# Patient Record
Sex: MALE | Race: WHITE | NOT HISPANIC OR LATINO | Employment: OTHER | URBAN - METROPOLITAN AREA
[De-identification: names, ages, dates, MRNs, and addresses within clinical notes are randomized per-mention and may not be internally consistent; named-entity substitution may affect disease eponyms.]

---

## 2022-11-14 PROBLEM — U07.1 COVID-19 VIRUS INFECTION: Status: ACTIVE | Noted: 2022-11-14

## 2022-12-05 PROBLEM — Z00.00 PREVENTATIVE HEALTH CARE: Status: ACTIVE | Noted: 2022-12-05

## 2022-12-05 PROBLEM — U07.1 COVID-19 VIRUS INFECTION: Status: RESOLVED | Noted: 2022-11-14 | Resolved: 2022-12-05

## 2022-12-05 PROBLEM — Z00.00 PREVENTATIVE HEALTH CARE: Status: RESOLVED | Noted: 2022-12-05 | Resolved: 2022-12-05

## 2022-12-05 PROBLEM — E78.00 HYPERCHOLESTEROLEMIA: Status: ACTIVE | Noted: 2022-12-05

## 2022-12-05 PROBLEM — E55.9 VITAMIN D DEFICIENCY, UNSPECIFIED: Status: ACTIVE | Noted: 2022-12-05

## 2022-12-05 PROBLEM — Z86.16 HISTORY OF 2019 NOVEL CORONAVIRUS DISEASE (COVID-19): Status: ACTIVE | Noted: 2022-12-05

## 2023-01-11 ENCOUNTER — OFFICE VISIT (OUTPATIENT)
Dept: OPHTHALMOLOGY | Facility: CLINIC | Age: 78
End: 2023-01-11
Payer: MEDICARE

## 2023-01-11 DIAGNOSIS — H35.033 HYPERTENSIVE RETINOPATHY, BILATERAL: ICD-10-CM

## 2023-01-11 DIAGNOSIS — H35.3221 EXUDATIVE AGE-RELATED MACULAR DEGENERATION OF LEFT EYE WITH ACTIVE CHOROIDAL NEOVASCULARIZATION: Primary | ICD-10-CM

## 2023-01-11 DIAGNOSIS — H35.3212 EXUDATIVE AGE-RELATED MACULAR DEGENERATION OF RIGHT EYE WITH INACTIVE CHOROIDAL NEOVASCULARIZATION: ICD-10-CM

## 2023-01-11 DIAGNOSIS — Z96.1 PSEUDOPHAKIA OF BOTH EYES: ICD-10-CM

## 2023-01-11 PROBLEM — H35.3232 EXUDATIVE AGE-RELATED MACULAR DEGENERATION OF BOTH EYES WITH INACTIVE CHOROIDAL NEOVASCULARIZATION: Status: ACTIVE | Noted: 2023-01-11

## 2023-01-11 PROCEDURE — 99999 PR PBB SHADOW E&M-EST. PATIENT-LVL III: ICD-10-PCS | Mod: PBBFAC,,, | Performed by: OPHTHALMOLOGY

## 2023-01-11 PROCEDURE — 99999 PR PBB SHADOW E&M-EST. PATIENT-LVL III: CPT | Mod: PBBFAC,,, | Performed by: OPHTHALMOLOGY

## 2023-01-11 PROCEDURE — 99204 OFFICE O/P NEW MOD 45 MIN: CPT | Mod: 25,S$GLB,, | Performed by: OPHTHALMOLOGY

## 2023-01-11 PROCEDURE — 3288F FALL RISK ASSESSMENT DOCD: CPT | Mod: CPTII,S$GLB,, | Performed by: OPHTHALMOLOGY

## 2023-01-11 PROCEDURE — 1126F AMNT PAIN NOTED NONE PRSNT: CPT | Mod: CPTII,S$GLB,, | Performed by: OPHTHALMOLOGY

## 2023-01-11 PROCEDURE — 1126F PR PAIN SEVERITY QUANTIFIED, NO PAIN PRESENT: ICD-10-PCS | Mod: CPTII,S$GLB,, | Performed by: OPHTHALMOLOGY

## 2023-01-11 PROCEDURE — 92134 CPTRZ OPH DX IMG PST SGM RTA: CPT | Mod: S$GLB,,, | Performed by: OPHTHALMOLOGY

## 2023-01-11 PROCEDURE — 92134 OCT, RETINA - OU - BOTH EYES: ICD-10-PCS | Mod: S$GLB,,, | Performed by: OPHTHALMOLOGY

## 2023-01-11 PROCEDURE — 1101F PR PT FALLS ASSESS DOC 0-1 FALLS W/OUT INJ PAST YR: ICD-10-PCS | Mod: CPTII,S$GLB,, | Performed by: OPHTHALMOLOGY

## 2023-01-11 PROCEDURE — 1159F MED LIST DOCD IN RCRD: CPT | Mod: CPTII,S$GLB,, | Performed by: OPHTHALMOLOGY

## 2023-01-11 PROCEDURE — 1159F PR MEDICATION LIST DOCUMENTED IN MEDICAL RECORD: ICD-10-PCS | Mod: CPTII,S$GLB,, | Performed by: OPHTHALMOLOGY

## 2023-01-11 PROCEDURE — 67028 INJECTION EYE DRUG: CPT | Mod: LT,S$GLB,, | Performed by: OPHTHALMOLOGY

## 2023-01-11 PROCEDURE — 99204 PR OFFICE/OUTPT VISIT, NEW, LEVL IV, 45-59 MIN: ICD-10-PCS | Mod: 25,S$GLB,, | Performed by: OPHTHALMOLOGY

## 2023-01-11 PROCEDURE — 3288F PR FALLS RISK ASSESSMENT DOCUMENTED: ICD-10-PCS | Mod: CPTII,S$GLB,, | Performed by: OPHTHALMOLOGY

## 2023-01-11 PROCEDURE — 1101F PT FALLS ASSESS-DOCD LE1/YR: CPT | Mod: CPTII,S$GLB,, | Performed by: OPHTHALMOLOGY

## 2023-01-11 PROCEDURE — 67028 PR INJECT INTRAVITREAL PHARMCOLOGIC: ICD-10-PCS | Mod: LT,S$GLB,, | Performed by: OPHTHALMOLOGY

## 2023-01-11 PROCEDURE — 1160F PR REVIEW ALL MEDS BY PRESCRIBER/CLIN PHARMACIST DOCUMENTED: ICD-10-PCS | Mod: CPTII,S$GLB,, | Performed by: OPHTHALMOLOGY

## 2023-01-11 PROCEDURE — 1160F RVW MEDS BY RX/DR IN RCRD: CPT | Mod: CPTII,S$GLB,, | Performed by: OPHTHALMOLOGY

## 2023-01-11 RX ADMIN — Medication 1.25 MG: at 02:01

## 2023-01-11 NOTE — PROGRESS NOTES
HPI    New pt here for AMD eval.   Former pt of dr Cason      1 injection OS. Not sure medication     No gtts.     Denies pain/ FOL/ floaters.   Last edited by Guera Mota on 1/11/2023  1:56 PM.         A/P    ICD-10-CM ICD-9-CM   1. Exudative age-related macular degeneration of left eye with active choroidal neovascularization  H35.3221 362.52     362.16   2. Exudative age-related macular degeneration of right eye with inactive choroidal neovascularization  H35.3212 362.52     362.16   3. Hypertensive retinopathy, bilateral  H35.033 362.11   4. Pseudophakia of both eyes  Z96.1 V43.1       1. Exudative age-related macular degeneration of left eye with active choroidal neovascularization  2. 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 Mendez, transitioning care to [facility]    OD: dry changes, central atrophy OD  Plan: Observe    OS: s/p SLY last month outside office  [place] 20/25, tr SRF, PED  Plan: needs Eylea for control of SRF for active CNVM had good response, no auth today will give Avastin and place auth for Eylea next visit  Based on todays exam, diagnostic studies, and review of records, the determination was made for treatment today.  Schedule Avastin Injection today Left Eye Patient chooses to proceed with injection R/B/A discussed include infection retinal detachment and stroke    Patient identified.  Timeout performed.    Risks, benefits, and alternatives to treatment were discussed in detail with the patient, including bleeding/infection (endophthalmitis)/etc.  The patient voiced understanding and wished to proceed with the procedure.  See separate consent form.    Injection Procedure Note:  Diagnosis: CNVM Left Eye  Left  Topical Proparacaine drop placed then topical 5% Betadine and then subconjunctival lidocaine 2% injection  Sterile gloves used, and sterile lid speculum placed.  5% Betadine placed at injection site again  prior to injection.  Avastin 1.25mg in 0.05cc Injected inferotemporally 3.5-4mm posterior to the limbus.  Complications: None  Va at least CF at 5 feet post injection.  Retina, ONH, IOP normal after injection.    Followup as below.  Patient should return immediately PRN.  Retinal Detachment and Endophthalmitis precautions given.     3. Hypertensive retinopathy, bilateral  Mild HTN changes  Plan: Observation  Recommend good blood pressure control, tight blood glucose control, and good cholesterol control     4. Pseudophakia of both eyes  Good lens position OU  Plan: Observation, update Mrx prn    RTC 1 mo DFE/OCTm OU, get auth for Eylea OS     I saw and examined the patient and reviewed in detail the findings documented. The final examination findings, image interpretations, and plan as documented in the record represent my personal judgment and conclusions.    Beto Norton MD  Vitreoretinal Surgery   [facility]

## 2023-01-17 NOTE — PATIENT INSTRUCTIONS

## 2023-01-24 DIAGNOSIS — M25.561 RIGHT KNEE PAIN, UNSPECIFIED CHRONICITY: Primary | ICD-10-CM

## 2023-01-25 ENCOUNTER — OFFICE VISIT (OUTPATIENT)
Dept: ORTHOPEDICS | Facility: CLINIC | Age: 78
End: 2023-01-25
Payer: MEDICARE

## 2023-01-25 ENCOUNTER — HOSPITAL ENCOUNTER (OUTPATIENT)
Dept: RADIOLOGY | Facility: HOSPITAL | Age: 78
Discharge: HOME | End: 2023-01-25
Attending: NURSE PRACTITIONER
Payer: MEDICARE

## 2023-01-25 VITALS — BODY MASS INDEX: 27.81 KG/M2 | HEIGHT: 66 IN | WEIGHT: 173.06 LBS

## 2023-01-25 DIAGNOSIS — S89.91XA RIGHT KNEE INJURY, INITIAL ENCOUNTER: ICD-10-CM

## 2023-01-25 DIAGNOSIS — M17.11 PRIMARY OSTEOARTHRITIS OF RIGHT KNEE: Primary | ICD-10-CM

## 2023-01-25 DIAGNOSIS — M25.561 RIGHT KNEE PAIN, UNSPECIFIED CHRONICITY: ICD-10-CM

## 2023-01-25 PROCEDURE — 1101F PT FALLS ASSESS-DOCD LE1/YR: CPT | Mod: CPTII,S$GLB,, | Performed by: NURSE PRACTITIONER

## 2023-01-25 PROCEDURE — 3288F PR FALLS RISK ASSESSMENT DOCUMENTED: ICD-10-PCS | Mod: CPTII,S$GLB,, | Performed by: NURSE PRACTITIONER

## 2023-01-25 PROCEDURE — 73560 X-RAY EXAM OF KNEE 1 OR 2: CPT | Mod: TC,59,PO,LT

## 2023-01-25 PROCEDURE — 73562 X-RAY EXAM OF KNEE 3: CPT | Mod: 26,RT,, | Performed by: RADIOLOGY

## 2023-01-25 PROCEDURE — 73560 X-RAY EXAM OF KNEE 1 OR 2: CPT | Mod: 26,LT,, | Performed by: RADIOLOGY

## 2023-01-25 PROCEDURE — 73560 XR KNEE ORTHO RIGHT: ICD-10-PCS | Mod: 26,LT,, | Performed by: RADIOLOGY

## 2023-01-25 PROCEDURE — 1160F RVW MEDS BY RX/DR IN RCRD: CPT | Mod: CPTII,S$GLB,, | Performed by: NURSE PRACTITIONER

## 2023-01-25 PROCEDURE — 1159F PR MEDICATION LIST DOCUMENTED IN MEDICAL RECORD: ICD-10-PCS | Mod: CPTII,S$GLB,, | Performed by: NURSE PRACTITIONER

## 2023-01-25 PROCEDURE — 1125F AMNT PAIN NOTED PAIN PRSNT: CPT | Mod: CPTII,S$GLB,, | Performed by: NURSE PRACTITIONER

## 2023-01-25 PROCEDURE — 99999 PR PBB SHADOW E&M-EST. PATIENT-LVL III: ICD-10-PCS | Mod: PBBFAC,,, | Performed by: NURSE PRACTITIONER

## 2023-01-25 PROCEDURE — 1160F PR REVIEW ALL MEDS BY PRESCRIBER/CLIN PHARMACIST DOCUMENTED: ICD-10-PCS | Mod: CPTII,S$GLB,, | Performed by: NURSE PRACTITIONER

## 2023-01-25 PROCEDURE — 1101F PR PT FALLS ASSESS DOC 0-1 FALLS W/OUT INJ PAST YR: ICD-10-PCS | Mod: CPTII,S$GLB,, | Performed by: NURSE PRACTITIONER

## 2023-01-25 PROCEDURE — 1125F PR PAIN SEVERITY QUANTIFIED, PAIN PRESENT: ICD-10-PCS | Mod: CPTII,S$GLB,, | Performed by: NURSE PRACTITIONER

## 2023-01-25 PROCEDURE — 3288F FALL RISK ASSESSMENT DOCD: CPT | Mod: CPTII,S$GLB,, | Performed by: NURSE PRACTITIONER

## 2023-01-25 PROCEDURE — 73562 XR KNEE ORTHO RIGHT: ICD-10-PCS | Mod: 26,RT,, | Performed by: RADIOLOGY

## 2023-01-25 PROCEDURE — 99203 PR OFFICE/OUTPT VISIT, NEW, LEVL III, 30-44 MIN: ICD-10-PCS | Mod: S$GLB,,, | Performed by: NURSE PRACTITIONER

## 2023-01-25 PROCEDURE — 99203 OFFICE O/P NEW LOW 30 MIN: CPT | Mod: S$GLB,,, | Performed by: NURSE PRACTITIONER

## 2023-01-25 PROCEDURE — 1159F MED LIST DOCD IN RCRD: CPT | Mod: CPTII,S$GLB,, | Performed by: NURSE PRACTITIONER

## 2023-01-25 PROCEDURE — 99999 PR PBB SHADOW E&M-EST. PATIENT-LVL III: CPT | Mod: PBBFAC,,, | Performed by: NURSE PRACTITIONER

## 2023-01-25 RX ORDER — METHYLPREDNISOLONE 4 MG/1
TABLET ORAL
Qty: 21 EACH | Refills: 0 | Status: SHIPPED | OUTPATIENT
Start: 2023-01-25 | End: 2023-02-15

## 2023-01-25 NOTE — PROGRESS NOTES
Chief Complaint   Patient presents with    Right Knee - Pain       HPI:    This is a 77 y.o. who presents today complaining of right knee pain for 2 weeks after playing golf and twisted his right knee a certain way. He was taking meloxicam, that his wife had at home and this seemed to help, but states his primary doctor wanted him to be seen first with ortho and told him to hold off on taking the medication for now. Pain has improved with the meloxicam. No numbness or tingling. No associated signs or symptoms.      Past Medical History:   Diagnosis Date    b Stage 3 Chronic Kidney Disease ###    c Hypercholesterolemia     12/5/22 RXd Crestor 20 Mg qHS With Labs In 6 Weeks    d Family H/O Diabetes Mellitus     i H/O 1/2 PPD X 25 Years TUD, Quit In 1982     i H/O COVID-19 Infection     His 11/14/22 COVID-19 Home Test = Was Positive    k Elevated PSA Level ###    1/17/22 Referred To Dr. Delgado landers H/O Ectopic Testicle Surgery     l Gouty Arthropathy ###    q Borderline Vitamin D Deficiency     12/5/22 RXd OTC D3 2K IU Daily    Wellness Visit 12/5/2022       Past Surgical History:   Procedure Laterality Date    TESTICLE SURGERY      ectpic surgery      Current Outpatient Medications on File Prior to Visit   Medication Sig Dispense Refill    aspirin (ECOTRIN) 81 MG EC tablet Take 1 tablet (81 mg total) by mouth once daily.  0    cholecalciferol, vitamin D3, (VITAMIN D3) 50 mcg (2,000 unit) Tab Take 1 tablet (2,000 Units total) by mouth once daily.      febuxostat (ULORIC) 40 mg Tab Take 1 tablet (40 mg total) by mouth once daily. 90 tablet 3    magnesium 30 mg Tab Take by mouth once.      multivitamin (THERAGRAN) per tablet Take 1 tablet by mouth once daily.      rosuvastatin (CRESTOR) 20 MG tablet Take 1 tablet (20 mg total) by mouth every evening. 90 tablet 3    UNABLE TO FIND Prostate Support       No current facility-administered medications on file prior to visit.      Review of patient's allergies  indicates:  No Known Allergies   Family History not pertinent   Social History     Socioeconomic History    Marital status:    Occupational History    Occupation: RETIRED   Tobacco Use    Smoking status: Former     Years: 35.00     Types: Cigarettes    Smokeless tobacco: Never   Substance and Sexual Activity    Alcohol use: Yes    Drug use: Never         Review of Systems:   Constitutional:  Denies fever or chills    Eyes:  Denies change in visual acuity    HENT:  Denies nasal congestion or sore throat    Respiratory:  Denies cough or shortness of breath    Cardiovascular:  Denies chest pain or edema    GI:  Denies abdominal pain, nausea, vomiting, bloody stools or diarrhea    :  Denies dysuria    Integument:  Denies rash    Neurologic:  Denies headache, focal weakness or sensory changes    Endocrine:  Denies polyuria or polydipsia    Lymphatic:  Denies swollen glands    Psychiatric:  Denies depression or anxiety       Physical Exam:    Constitutional:  Well developed, well nourished, no acute distress, non-toxic appearance    Integument:  Well hydrated  Neurologic:  Alert & oriented x 3  Psychiatric:  Speech and behavior appropriate      Bilateral Knee Exam    Right Knee Exam   Tenderness   The patient is experiencing tenderness in the medial joint line.    Range of Motion   Flexion: abnormal     Muscle Strength   The patient has normal bilateral knee strength.    Tests   Anam:  Medial - positive   Lachman:  Anterior - negative      Varus: negative  Valgus: negative  Patellar Apprehension: negative      Other   Erythema: absent  Sensation: normal  Pulse: present  Swelling: mild    Left Knee exam   Knee exam performed same as contralateral side and is normal        X-rays were performed today, personally reviewed by me and findings discussed with the patient.  3 views of the right knee show tricompartmental degenerative changes; with more right medial joint space narrowing.                  Primary  osteoarthritis of right knee  -     methylPREDNISolone (MEDROL DOSEPACK) 4 mg tablet; use as directed  Dispense: 21 each; Refill: 0    Right knee injury, initial encounter  -     Ambulatory referral/consult to Orthopedics    Medrol dose pack   Then may resume meloxicam as needed if okay with primary care as I reviewed his last creatinine was 1.40.      Apply a compressive ACE bandage. Rest and elevate the affected painful area.  Apply cold compresses intermittently as needed.  As pain recedes, begin normal activities slowly as tolerated.  Call if symptoms persist.           Answers submitted by the patient for this visit:  New Patient on 1/25/2023  9:00 AM with Guera Ku  Orthopedics Questionnaire (Submitted on 1/24/2023)  unexpected weight change: No  appetite change : No  sleep disturbance: No  IMMUNOCOMPROMISED: No  nervous/ anxious: No  dysphoric mood: No  rash: No  visual disturbance: No  eye redness: No  eye pain: No  ear pain: No  tinnitus: No  hearing loss: No  sinus pressure : No  nosebleeds: No  enviro allergies: No  food allergies: No  cough: No  shortness of breath: No  sweating: No  frequency: No  difficulty urinating: No  hematuria: No  chest pain: No  palpitations: No  nausea: No  vomiting: No  diarrhea: No  blood in stool: No  constipation: No  headaches: No  dizziness: No  numbness: No  seizures: No  joint swelling: No  myalgia: No  weakness: No  back pain: No

## 2023-02-06 ENCOUNTER — TELEPHONE (OUTPATIENT)
Dept: OPHTHALMOLOGY | Facility: CLINIC | Age: 78
End: 2023-02-06
Payer: MEDICARE

## 2023-02-06 NOTE — TELEPHONE ENCOUNTER
----- Message from Anna Marie Rubin sent at 2/6/2023  9:57 AM CST -----  Contact: wife  Type:  Needs Medical Advice    Who Called: wife    Would the patient rather a call back or a response via [facility]? call  Best Call Back Number: [phone] (home)     Additional Information: pt wants to schedule with . pt will be a new. Please advise and thank you.

## 2023-02-09 ENCOUNTER — OFFICE VISIT (OUTPATIENT)
Dept: OPTOMETRY | Facility: CLINIC | Age: 78
End: 2023-02-09
Payer: MEDICARE

## 2023-02-09 DIAGNOSIS — H52.7 REFRACTIVE ERROR: ICD-10-CM

## 2023-02-09 DIAGNOSIS — H04.123 BILATERAL DRY EYES: Primary | ICD-10-CM

## 2023-02-09 PROCEDURE — 1160F RVW MEDS BY RX/DR IN RCRD: CPT | Mod: CPTII,S$GLB,, | Performed by: OPTOMETRIST

## 2023-02-09 PROCEDURE — 1159F PR MEDICATION LIST DOCUMENTED IN MEDICAL RECORD: ICD-10-PCS | Mod: CPTII,S$GLB,, | Performed by: OPTOMETRIST

## 2023-02-09 PROCEDURE — 1126F AMNT PAIN NOTED NONE PRSNT: CPT | Mod: CPTII,S$GLB,, | Performed by: OPTOMETRIST

## 2023-02-09 PROCEDURE — 92015 PR REFRACTION: ICD-10-PCS | Mod: S$GLB,,, | Performed by: OPTOMETRIST

## 2023-02-09 PROCEDURE — 99203 PR OFFICE/OUTPT VISIT, NEW, LEVL III, 30-44 MIN: ICD-10-PCS | Mod: S$GLB,,, | Performed by: OPTOMETRIST

## 2023-02-09 PROCEDURE — 92015 DETERMINE REFRACTIVE STATE: CPT | Mod: S$GLB,,, | Performed by: OPTOMETRIST

## 2023-02-09 PROCEDURE — 99999 PR PBB SHADOW E&M-EST. PATIENT-LVL III: CPT | Mod: PBBFAC,,, | Performed by: OPTOMETRIST

## 2023-02-09 PROCEDURE — 99999 PR PBB SHADOW E&M-EST. PATIENT-LVL III: ICD-10-PCS | Mod: PBBFAC,,, | Performed by: OPTOMETRIST

## 2023-02-09 PROCEDURE — 1101F PR PT FALLS ASSESS DOC 0-1 FALLS W/OUT INJ PAST YR: ICD-10-PCS | Mod: CPTII,S$GLB,, | Performed by: OPTOMETRIST

## 2023-02-09 PROCEDURE — 1159F MED LIST DOCD IN RCRD: CPT | Mod: CPTII,S$GLB,, | Performed by: OPTOMETRIST

## 2023-02-09 PROCEDURE — 3288F PR FALLS RISK ASSESSMENT DOCUMENTED: ICD-10-PCS | Mod: CPTII,S$GLB,, | Performed by: OPTOMETRIST

## 2023-02-09 PROCEDURE — 99203 OFFICE O/P NEW LOW 30 MIN: CPT | Mod: S$GLB,,, | Performed by: OPTOMETRIST

## 2023-02-09 PROCEDURE — 1101F PT FALLS ASSESS-DOCD LE1/YR: CPT | Mod: CPTII,S$GLB,, | Performed by: OPTOMETRIST

## 2023-02-09 PROCEDURE — 1160F PR REVIEW ALL MEDS BY PRESCRIBER/CLIN PHARMACIST DOCUMENTED: ICD-10-PCS | Mod: CPTII,S$GLB,, | Performed by: OPTOMETRIST

## 2023-02-09 PROCEDURE — 3288F FALL RISK ASSESSMENT DOCD: CPT | Mod: CPTII,S$GLB,, | Performed by: OPTOMETRIST

## 2023-02-09 PROCEDURE — 1126F PR PAIN SEVERITY QUANTIFIED, NO PAIN PRESENT: ICD-10-PCS | Mod: CPTII,S$GLB,, | Performed by: OPTOMETRIST

## 2023-02-09 NOTE — PROGRESS NOTES
HPI     Blurred Vision     Additional comments: Blur ou at near x mos, no assoc pain or red,   constant, no relief over time. Fatigue of eyes with long term near work           Comments    Referred from Dr. Norton for MRX      Pt is coming to get rx for gls. Pt denies floaters/eye pain/ flashes.   Denies gtts. Glasses for tv and up close. Needs new rx. PT using specs   computer and up close work often.          Last edited by Jermaine Hicks, OD on 2/9/2023  1:20 PM.            Assessment /Plan     For exam results, see Encounter Report.    Bilateral dry eyes    Refractive error      Some near blur with dry eye component, Recommend artificial tears. 1 drop 2x per day. Chronicity of disease and treatment discussed.   2. Spectacle Rx given, discussed different options for glasses. RTC 1 year routine eye exam.

## 2023-02-15 ENCOUNTER — OFFICE VISIT (OUTPATIENT)
Dept: OPHTHALMOLOGY | Facility: CLINIC | Age: 78
End: 2023-02-15
Payer: MEDICARE

## 2023-02-15 DIAGNOSIS — H35.033 HYPERTENSIVE RETINOPATHY, BILATERAL: ICD-10-CM

## 2023-02-15 DIAGNOSIS — H35.3221 EXUDATIVE AGE-RELATED MACULAR DEGENERATION OF LEFT EYE WITH ACTIVE CHOROIDAL NEOVASCULARIZATION: Primary | ICD-10-CM

## 2023-02-15 DIAGNOSIS — H35.3212 EXUDATIVE AGE-RELATED MACULAR DEGENERATION OF RIGHT EYE WITH INACTIVE CHOROIDAL NEOVASCULARIZATION: ICD-10-CM

## 2023-02-15 DIAGNOSIS — Z96.1 PSEUDOPHAKIA OF BOTH EYES: ICD-10-CM

## 2023-02-15 PROCEDURE — 1126F PR PAIN SEVERITY QUANTIFIED, NO PAIN PRESENT: ICD-10-PCS | Mod: CPTII,S$GLB,, | Performed by: OPHTHALMOLOGY

## 2023-02-15 PROCEDURE — 1160F RVW MEDS BY RX/DR IN RCRD: CPT | Mod: CPTII,S$GLB,, | Performed by: OPHTHALMOLOGY

## 2023-02-15 PROCEDURE — 99999 PR PBB SHADOW E&M-EST. PATIENT-LVL II: CPT | Mod: PBBFAC,,, | Performed by: OPHTHALMOLOGY

## 2023-02-15 PROCEDURE — 1160F PR REVIEW ALL MEDS BY PRESCRIBER/CLIN PHARMACIST DOCUMENTED: ICD-10-PCS | Mod: CPTII,S$GLB,, | Performed by: OPHTHALMOLOGY

## 2023-02-15 PROCEDURE — 92134 OCT, RETINA - OU - BOTH EYES: ICD-10-PCS | Mod: S$GLB,,, | Performed by: OPHTHALMOLOGY

## 2023-02-15 PROCEDURE — 99214 OFFICE O/P EST MOD 30 MIN: CPT | Mod: 25,S$GLB,, | Performed by: OPHTHALMOLOGY

## 2023-02-15 PROCEDURE — 1101F PT FALLS ASSESS-DOCD LE1/YR: CPT | Mod: CPTII,S$GLB,, | Performed by: OPHTHALMOLOGY

## 2023-02-15 PROCEDURE — 1159F PR MEDICATION LIST DOCUMENTED IN MEDICAL RECORD: ICD-10-PCS | Mod: CPTII,S$GLB,, | Performed by: OPHTHALMOLOGY

## 2023-02-15 PROCEDURE — 1101F PR PT FALLS ASSESS DOC 0-1 FALLS W/OUT INJ PAST YR: ICD-10-PCS | Mod: CPTII,S$GLB,, | Performed by: OPHTHALMOLOGY

## 2023-02-15 PROCEDURE — 1126F AMNT PAIN NOTED NONE PRSNT: CPT | Mod: CPTII,S$GLB,, | Performed by: OPHTHALMOLOGY

## 2023-02-15 PROCEDURE — 1159F MED LIST DOCD IN RCRD: CPT | Mod: CPTII,S$GLB,, | Performed by: OPHTHALMOLOGY

## 2023-02-15 PROCEDURE — 99214 PR OFFICE/OUTPT VISIT, EST, LEVL IV, 30-39 MIN: ICD-10-PCS | Mod: 25,S$GLB,, | Performed by: OPHTHALMOLOGY

## 2023-02-15 PROCEDURE — 92134 CPTRZ OPH DX IMG PST SGM RTA: CPT | Mod: S$GLB,,, | Performed by: OPHTHALMOLOGY

## 2023-02-15 PROCEDURE — 3288F FALL RISK ASSESSMENT DOCD: CPT | Mod: CPTII,S$GLB,, | Performed by: OPHTHALMOLOGY

## 2023-02-15 PROCEDURE — 67028 PR INJECT INTRAVITREAL PHARMCOLOGIC: ICD-10-PCS | Mod: LT,S$GLB,, | Performed by: OPHTHALMOLOGY

## 2023-02-15 PROCEDURE — 3288F PR FALLS RISK ASSESSMENT DOCUMENTED: ICD-10-PCS | Mod: CPTII,S$GLB,, | Performed by: OPHTHALMOLOGY

## 2023-02-15 PROCEDURE — 67028 INJECTION EYE DRUG: CPT | Mod: LT,S$GLB,, | Performed by: OPHTHALMOLOGY

## 2023-02-15 PROCEDURE — 99999 PR PBB SHADOW E&M-EST. PATIENT-LVL II: ICD-10-PCS | Mod: PBBFAC,,, | Performed by: OPHTHALMOLOGY

## 2023-02-15 NOTE — PROGRESS NOTES
HPI    DLS: 1/11/2023- AMD f/u w/ possible SLY OS     Pt states no new complaints.   Denies pain/ FOL/ floaters.   Last edited by Guera Moat on 2/15/2023  1:16 PM.           A/P    ICD-10-CM ICD-9-CM   1. Exudative age-related macular degeneration of left eye with active choroidal neovascularization  H35.3221 362.52     362.16   2. Exudative age-related macular degeneration of right eye with inactive choroidal neovascularization  H35.3212 362.52     362.16   3. Hypertensive retinopathy, bilateral  H35.033 362.11   4. Pseudophakia of both eyes  Z96.1 V43.1       1. Exudative age-related macular degeneration of left eye with active choroidal neovascularization  2. 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 Mendez, transitioning care to [facility]    OD: dry, central atrophy OD  Plan: Observe    OS: s/p SLY in past  S/p KENISHA 1/11/23  [place] 20/30 (was 20/25), tr SRF better, PED  Plan:  given better control with Eylea in past, recommend Eylea OS today for CNVM  Based on todays exam, diagnostic studies, and review of records, the determination was made for treatment today.  Schedule Eylea Injection Left Eye today Patient chooses to proceed with injection R/B/A discussed include infection retinal detachment and stroke    Patient identified.  Timeout performed.    Risks, benefits, and alternatives to treatment were discussed in detail with the patient, including bleeding/infection (endophthalmitis)/etc.  The patient voiced understanding and wished to proceed with the procedure.  See separate consent form.    Injection Procedure Note:  Diagnosis: CNVM Left Eye    Topical Proparacaine drop placed then topical 5% Betadine, then subcojunctival lidocaine 2% injection  Sterile gloves used, and sterile lid speculum placed.  5% Betadine placed at injection site again prior to injection.  Eylea 2mg in 0.05cc Injected inferotemporally 3.5-4mm posterior to the  limbus.  Complications: None  Va at least CF at 5 feet post injection.  Retina, ONH, IOP normal after injection.    Followup as below.  Patient should return immediately PRN.  Retinal Detachment and Endophthalmitis precautions given     3. Hypertensive retinopathy, bilateral  Mild HTN changes  Plan: Observation  Recommend good blood pressure control, tight blood glucose control, and good cholesterol control     4. Pseudophakia of both eyes  Good lens position OU  Plan: Observation, update Mrx prn      RTC 1 mo DFE/OCTm OU, likely Eylea OS     I saw and examined the patient and reviewed in detail the findings documented. The final examination findings, image interpretations, and plan as documented in the record represent my personal judgment and conclusions.    Beto Norton MD  Vitreoretinal Surgery   [facility]

## 2023-02-20 NOTE — PATIENT INSTRUCTIONS

## 2023-03-16 ENCOUNTER — OFFICE VISIT (OUTPATIENT)
Dept: OPHTHALMOLOGY | Facility: CLINIC | Age: 78
End: 2023-03-16
Payer: MEDICARE

## 2023-03-16 DIAGNOSIS — H35.3212 EXUDATIVE AGE-RELATED MACULAR DEGENERATION OF RIGHT EYE WITH INACTIVE CHOROIDAL NEOVASCULARIZATION: ICD-10-CM

## 2023-03-16 DIAGNOSIS — Z96.1 PSEUDOPHAKIA OF BOTH EYES: ICD-10-CM

## 2023-03-16 DIAGNOSIS — H35.033 HYPERTENSIVE RETINOPATHY, BILATERAL: ICD-10-CM

## 2023-03-16 DIAGNOSIS — H35.3221 EXUDATIVE AGE-RELATED MACULAR DEGENERATION OF LEFT EYE WITH ACTIVE CHOROIDAL NEOVASCULARIZATION: Primary | ICD-10-CM

## 2023-03-16 PROCEDURE — 67028 INJECTION EYE DRUG: CPT | Mod: LT,S$GLB,, | Performed by: OPHTHALMOLOGY

## 2023-03-16 PROCEDURE — 1159F PR MEDICATION LIST DOCUMENTED IN MEDICAL RECORD: ICD-10-PCS | Mod: CPTII,S$GLB,, | Performed by: OPHTHALMOLOGY

## 2023-03-16 PROCEDURE — 1160F PR REVIEW ALL MEDS BY PRESCRIBER/CLIN PHARMACIST DOCUMENTED: ICD-10-PCS | Mod: CPTII,S$GLB,, | Performed by: OPHTHALMOLOGY

## 2023-03-16 PROCEDURE — 1160F RVW MEDS BY RX/DR IN RCRD: CPT | Mod: CPTII,S$GLB,, | Performed by: OPHTHALMOLOGY

## 2023-03-16 PROCEDURE — 99999 PR PBB SHADOW E&M-EST. PATIENT-LVL I: ICD-10-PCS | Mod: PBBFAC,,, | Performed by: OPHTHALMOLOGY

## 2023-03-16 PROCEDURE — 99214 PR OFFICE/OUTPT VISIT, EST, LEVL IV, 30-39 MIN: ICD-10-PCS | Mod: 25,S$GLB,, | Performed by: OPHTHALMOLOGY

## 2023-03-16 PROCEDURE — 99214 OFFICE O/P EST MOD 30 MIN: CPT | Mod: 25,S$GLB,, | Performed by: OPHTHALMOLOGY

## 2023-03-16 PROCEDURE — 92134 CPTRZ OPH DX IMG PST SGM RTA: CPT | Mod: S$GLB,,, | Performed by: OPHTHALMOLOGY

## 2023-03-16 PROCEDURE — 67028 PR INJECT INTRAVITREAL PHARMCOLOGIC: ICD-10-PCS | Mod: LT,S$GLB,, | Performed by: OPHTHALMOLOGY

## 2023-03-16 PROCEDURE — 99999 PR PBB SHADOW E&M-EST. PATIENT-LVL I: CPT | Mod: PBBFAC,,, | Performed by: OPHTHALMOLOGY

## 2023-03-16 PROCEDURE — 1159F MED LIST DOCD IN RCRD: CPT | Mod: CPTII,S$GLB,, | Performed by: OPHTHALMOLOGY

## 2023-03-16 PROCEDURE — 92134 OCT, RETINA - OU - BOTH EYES: ICD-10-PCS | Mod: S$GLB,,, | Performed by: OPHTHALMOLOGY

## 2023-03-16 NOTE — PROGRESS NOTES
HPI       78 yr old presents to clinic for one month follow up CNVM OS. S/p Eylea   injection OS 2/15/2023   Pt states that he is doing well.     Denies pain/ FOL/ floaters.   No drops   Last edited by Ara Arellano MA on 3/16/2023  1:29 PM.            A/P    ICD-10-CM ICD-9-CM   1. Exudative age-related macular degeneration of left eye with active choroidal neovascularization  H35.3221 362.52     362.16   2. Exudative age-related macular degeneration of right eye with inactive choroidal neovascularization  H35.3212 362.52     362.16   3. Hypertensive retinopathy, bilateral  H35.033 362.11   4. Pseudophakia of both eyes  Z96.1 V43.1       1. Exudative age-related macular degeneration of left eye with active choroidal neovascularization  2. 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 Mendez, transitioning care to [facility]    OD: 20/70 ecc, Dry changes only, stable, central atrophy OD  Plan: Observe    OS: s/p SLY in past  S/p KENISHA X2 2/15/23   [place] 20/25 (was 20/30), tr SRF improving, PED  Plan:  given better control with Eylea in past, recommend Eylea OS today for CNVM  Based on todays exam, diagnostic studies, and review of records, the determination was made for treatment today.  Schedule Eylea Injection Left Eye today Patient chooses to proceed with injection R/B/A discussed include infection retinal detachment and stroke    Patient identified.  Timeout performed.    Risks, benefits, and alternatives to treatment were discussed in detail with the patient, including bleeding/infection (endophthalmitis)/etc.  The patient voiced understanding and wished to proceed with the procedure.  See separate consent form.    Injection Procedure Note:  Diagnosis: CNVM Left Eye    Topical Proparacaine drop placed then topical 5% Betadine, then subcojunctival lidocaine 2% injection  Sterile gloves used, and sterile lid speculum placed.  5% Betadine  placed at injection site again prior to injection.  Eylea 2mg in 0.05cc Injected inferotemporally 3.5-4mm posterior to the limbus.  Complications: None  Va at least CF at 5 feet post injection.  Retina, ONH, IOP normal after injection.    Followup as below.  Patient should return immediately PRN.  Retinal Detachment and Endophthalmitis precautions given     3. Hypertensive retinopathy, bilateral  Mild HTN changes  Plan: Observation  Recommend good blood pressure control, tight blood glucose control, and good cholesterol control     4. Pseudophakia of both eyes  Good lens position OU  Plan: Observation, update Mrx   Monocular precautions       RTC 6 weeksDFE/OCTm OU, likely Eylea OS     I saw and examined the patient and reviewed in detail the findings documented. The final examination findings, image interpretations, and plan as documented in the record represent my personal judgment and conclusions.    Beto Norton MD  Vitreoretinal Surgery   [facility]

## 2023-04-27 ENCOUNTER — PROCEDURE VISIT (OUTPATIENT)
Dept: OPHTHALMOLOGY | Facility: CLINIC | Age: 78
End: 2023-04-27
Payer: MEDICARE

## 2023-04-27 DIAGNOSIS — H35.033 HYPERTENSIVE RETINOPATHY, BILATERAL: ICD-10-CM

## 2023-04-27 DIAGNOSIS — Z96.1 PSEUDOPHAKIA OF BOTH EYES: ICD-10-CM

## 2023-04-27 DIAGNOSIS — H35.3221 EXUDATIVE AGE-RELATED MACULAR DEGENERATION OF LEFT EYE WITH ACTIVE CHOROIDAL NEOVASCULARIZATION: Primary | ICD-10-CM

## 2023-04-27 DIAGNOSIS — H35.3212 EXUDATIVE AGE-RELATED MACULAR DEGENERATION OF RIGHT EYE WITH INACTIVE CHOROIDAL NEOVASCULARIZATION: ICD-10-CM

## 2023-04-27 PROCEDURE — 99214 OFFICE O/P EST MOD 30 MIN: CPT | Mod: 25,S$GLB,, | Performed by: OPHTHALMOLOGY

## 2023-04-27 PROCEDURE — 67028 PR INJECT INTRAVITREAL PHARMCOLOGIC: ICD-10-PCS | Mod: LT,S$GLB,, | Performed by: OPHTHALMOLOGY

## 2023-04-27 PROCEDURE — 67028 INJECTION EYE DRUG: CPT | Mod: LT,S$GLB,, | Performed by: OPHTHALMOLOGY

## 2023-04-27 PROCEDURE — 92134 CPTRZ OPH DX IMG PST SGM RTA: CPT | Mod: S$GLB,,, | Performed by: OPHTHALMOLOGY

## 2023-04-27 PROCEDURE — 92134 OCT, RETINA - OU - BOTH EYES: ICD-10-PCS | Mod: S$GLB,,, | Performed by: OPHTHALMOLOGY

## 2023-04-27 PROCEDURE — 99214 PR OFFICE/OUTPT VISIT, EST, LEVL IV, 30-39 MIN: ICD-10-PCS | Mod: 25,S$GLB,, | Performed by: OPHTHALMOLOGY

## 2023-04-27 NOTE — PROGRESS NOTES
HANNAH ROSENBAUM 3/16/23 with Dr Norton    78 yr old presents to clinic for Eyelea Inj OS.   S/P Eylea injection OS 2/15/2023  Pt states that he is doing well.     Denies pain/ FOL/ floaters.   No drops   Last edited by Lake Montaño on 4/27/2023  1:32 PM.             A/P    ICD-10-CM ICD-9-CM   1. Exudative age-related macular degeneration of left eye with active choroidal neovascularization  H35.3221 362.52     362.16   2. Exudative age-related macular degeneration of right eye with inactive choroidal neovascularization  H35.3212 362.52     362.16   3. Hypertensive retinopathy, bilateral  H35.033 362.11   4. Pseudophakia of both eyes  Z96.1 V43.1       1. Exudative age-related macular degeneration of left eye with active choroidal neovascularization  2. 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 Mendez, transitioning care to [facility]    OD: 20/70 ecc, Dry changes only, stable, central atrophy OD  Plan: Observe    OS: s/p SLY in past  S/p KENISHA X3 3/16/23   S/p SLY 3/16/23  [place] 20/20 (was 20/25), tr SRF improving, PED  Plan:  given better control with Eylea in past, recommend Eylea OS today for CNVM  Based on todays exam, diagnostic studies, and review of records, the determination was made for treatment today.  Schedule Eylea Injection Left Eye today Patient chooses to proceed with injection R/B/A discussed include infection retinal detachment and stroke    Patient identified.  Timeout performed.    Risks, benefits, and alternatives to treatment were discussed in detail with the patient, including bleeding/infection (endophthalmitis)/etc.  The patient voiced understanding and wished to proceed with the procedure.  See separate consent form.    Injection Procedure Note:  Diagnosis: CNVM Left Eye    Topical Proparacaine drop placed then topical 5% Betadine, then subcojunctival lidocaine 2% injection  Sterile gloves used, and sterile lid speculum  placed.  5% Betadine placed at injection site again prior to injection.  Eylea 2mg in 0.05cc Injected inferotemporally 3.5-4mm posterior to the limbus.  Complications: None  Va at least CF at 5 feet post injection.  Retina, ONH, IOP normal after injection.    Followup as below.  Patient should return immediately PRN.  Retinal Detachment and Endophthalmitis precautions given     3. Hypertensive retinopathy, bilateral  Mild HTN changes  Plan: Observation  Recommend good blood pressure control, tight blood glucose control, and good cholesterol control     4. Pseudophakia of both eyes  Good lens position OU  Plan: Observation, update Mrx   Monocular precautions       RTC Sept when patient back in states- DFE/OCTm OU, likely Eylea OS     I saw and examined the patient and reviewed in detail the findings documented. The final examination findings, image interpretations, and plan as documented in the record represent my personal judgment and conclusions.    Beto Norton MD  Vitreoretinal Surgery   [facility]

## 2023-10-03 ENCOUNTER — OFFICE VISIT (OUTPATIENT)
Dept: OPHTHALMOLOGY | Facility: CLINIC | Age: 78
End: 2023-10-03
Payer: MEDICARE

## 2023-10-03 DIAGNOSIS — H35.3212 EXUDATIVE AGE-RELATED MACULAR DEGENERATION OF RIGHT EYE WITH INACTIVE CHOROIDAL NEOVASCULARIZATION: ICD-10-CM

## 2023-10-03 DIAGNOSIS — Z96.1 PSEUDOPHAKIA OF BOTH EYES: ICD-10-CM

## 2023-10-03 DIAGNOSIS — H35.033 HYPERTENSIVE RETINOPATHY, BILATERAL: ICD-10-CM

## 2023-10-03 DIAGNOSIS — H35.3221 EXUDATIVE AGE-RELATED MACULAR DEGENERATION OF LEFT EYE WITH ACTIVE CHOROIDAL NEOVASCULARIZATION: Primary | ICD-10-CM

## 2023-10-03 PROCEDURE — 99999 PR PBB SHADOW E&M-EST. PATIENT-LVL II: ICD-10-PCS | Mod: PBBFAC,,, | Performed by: OPHTHALMOLOGY

## 2023-10-03 PROCEDURE — 3288F FALL RISK ASSESSMENT DOCD: CPT | Mod: CPTII,S$GLB,, | Performed by: OPHTHALMOLOGY

## 2023-10-03 PROCEDURE — 1159F PR MEDICATION LIST DOCUMENTED IN MEDICAL RECORD: ICD-10-PCS | Mod: CPTII,S$GLB,, | Performed by: OPHTHALMOLOGY

## 2023-10-03 PROCEDURE — 1126F AMNT PAIN NOTED NONE PRSNT: CPT | Mod: CPTII,S$GLB,, | Performed by: OPHTHALMOLOGY

## 2023-10-03 PROCEDURE — 1101F PT FALLS ASSESS-DOCD LE1/YR: CPT | Mod: CPTII,S$GLB,, | Performed by: OPHTHALMOLOGY

## 2023-10-03 PROCEDURE — 92134 OCT, RETINA - OU - BOTH EYES: ICD-10-PCS | Mod: S$GLB,,, | Performed by: OPHTHALMOLOGY

## 2023-10-03 PROCEDURE — 1101F PR PT FALLS ASSESS DOC 0-1 FALLS W/OUT INJ PAST YR: ICD-10-PCS | Mod: CPTII,S$GLB,, | Performed by: OPHTHALMOLOGY

## 2023-10-03 PROCEDURE — 1126F PR PAIN SEVERITY QUANTIFIED, NO PAIN PRESENT: ICD-10-PCS | Mod: CPTII,S$GLB,, | Performed by: OPHTHALMOLOGY

## 2023-10-03 PROCEDURE — 92202 OPSCPY EXTND ON/MAC DRAW: CPT | Mod: 59,S$GLB,, | Performed by: OPHTHALMOLOGY

## 2023-10-03 PROCEDURE — 1160F RVW MEDS BY RX/DR IN RCRD: CPT | Mod: CPTII,S$GLB,, | Performed by: OPHTHALMOLOGY

## 2023-10-03 PROCEDURE — 99999 PR PBB SHADOW E&M-EST. PATIENT-LVL II: CPT | Mod: PBBFAC,,, | Performed by: OPHTHALMOLOGY

## 2023-10-03 PROCEDURE — 1159F MED LIST DOCD IN RCRD: CPT | Mod: CPTII,S$GLB,, | Performed by: OPHTHALMOLOGY

## 2023-10-03 PROCEDURE — 92134 CPTRZ OPH DX IMG PST SGM RTA: CPT | Mod: S$GLB,,, | Performed by: OPHTHALMOLOGY

## 2023-10-03 PROCEDURE — 3288F PR FALLS RISK ASSESSMENT DOCUMENTED: ICD-10-PCS | Mod: CPTII,S$GLB,, | Performed by: OPHTHALMOLOGY

## 2023-10-03 PROCEDURE — 92202 PR OPHTHALMOSCOPY, EXT, W/DRAW OPTIC NERVE/MACULA, I&R, UNI/BI: ICD-10-PCS | Mod: 59,S$GLB,, | Performed by: OPHTHALMOLOGY

## 2023-10-03 PROCEDURE — 92014 COMPRE OPH EXAM EST PT 1/>: CPT | Mod: S$GLB,,, | Performed by: OPHTHALMOLOGY

## 2023-10-03 PROCEDURE — 1160F PR REVIEW ALL MEDS BY PRESCRIBER/CLIN PHARMACIST DOCUMENTED: ICD-10-PCS | Mod: CPTII,S$GLB,, | Performed by: OPHTHALMOLOGY

## 2023-10-03 PROCEDURE — 92014 PR EYE EXAM, EST PATIENT,COMPREHESV: ICD-10-PCS | Mod: S$GLB,,, | Performed by: OPHTHALMOLOGY

## 2023-10-03 NOTE — PROGRESS NOTES
HPI    6 wk DFE/OCTm OU/Likely Eylea OS    DLS 03/16/2023 by Dr. RUBEN Norton MD    Cc: pt reports:       --Blurred Va  ++Diplopia OD  --Eye Pain   --Flashes/Floaters  --Headaches  --Curtains/Shadows/Veils    Eye Meds: AT's     POHx:   1.Exudative age-related macular degeneration of left eye with active   choroidal neovascularization      S/P SLY in the past/ KENISHA x 2 02/15/23   S/P Eylea OS 03/16/2023      2. Exudative age-related macular degeneration of right eye with inactive   choroidal neovascularization     3. Hypertensive retinopathy, bilateral     4. Pseudophakia of both eyes   5. Central Atrophy OD          Last edited by Edilma Keller MA on 10/3/2023  1:30 PM.          A/P    ICD-10-CM ICD-9-CM   1. Exudative age-related macular degeneration of left eye with active choroidal neovascularization  H35.3221 362.52     362.16   2. Exudative age-related macular degeneration of right eye with inactive choroidal neovascularization  H35.3212 362.52     362.16   3. Hypertensive retinopathy, bilateral  H35.033 362.11   4. Pseudophakia of both eyes  Z96.1 V43.1         1. Exudative age-related macular degeneration of left eye with active choroidal neovascularization  2. 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 Mendez, transitioning care to [facility]    OD: 20/60 ecc (Was 20/70), Dry changes , stable, central atrophy OD  Plan: Observe, inactive CNVM     OS: s/p SLY in past  S/p KENISHA X3 3/16/23   S/p SLY x2 4/27/23  [place] 20/25 (was 20/20), tr SRF resolved, PED  Plan:  dry today, last injection was April, monocular, will observe and bring back 4-6 weeks, would be about 6 mo at this point, may need Eylea at this time will get auth      3. Hypertensive retinopathy, bilateral  PCP Aramis Barakat MD - notes reviewed  01/12/2022  5.5  A1C   Mild HTN changes  Plan: Observation  Recommend good blood pressure control, tight blood glucose control,  and good cholesterol control     4. Pseudophakia of both eyes  Good lens position OU  Plan: Observation, update Mrx   Monocular precautions       RTC 4-6 weeks DFE/OCTm OU, poss Eylea OS     I saw and examined the patient and reviewed in detail the findings documented. The final examination findings, image interpretations, and plan as documented in the record represent my personal judgment and conclusions.    Beto Norton MD  Vitreoretinal Surgery   [facility]

## 2023-10-31 ENCOUNTER — OFFICE VISIT (OUTPATIENT)
Dept: OPHTHALMOLOGY | Facility: CLINIC | Age: 78
End: 2023-10-31
Payer: MEDICARE

## 2023-10-31 DIAGNOSIS — H43.813 VITREOUS DEGENERATION OF BOTH EYES: ICD-10-CM

## 2023-10-31 DIAGNOSIS — H35.3212 EXUDATIVE AGE-RELATED MACULAR DEGENERATION OF RIGHT EYE WITH INACTIVE CHOROIDAL NEOVASCULARIZATION: ICD-10-CM

## 2023-10-31 DIAGNOSIS — Z96.1 PSEUDOPHAKIA OF BOTH EYES: ICD-10-CM

## 2023-10-31 DIAGNOSIS — H35.3221 EXUDATIVE AGE-RELATED MACULAR DEGENERATION OF LEFT EYE WITH ACTIVE CHOROIDAL NEOVASCULARIZATION: Primary | ICD-10-CM

## 2023-10-31 DIAGNOSIS — H35.033 HYPERTENSIVE RETINOPATHY, BILATERAL: ICD-10-CM

## 2023-10-31 PROCEDURE — 99999 PR PBB SHADOW E&M-EST. PATIENT-LVL III: ICD-10-PCS | Mod: PBBFAC,,, | Performed by: OPHTHALMOLOGY

## 2023-10-31 PROCEDURE — 1101F PR PT FALLS ASSESS DOC 0-1 FALLS W/OUT INJ PAST YR: ICD-10-PCS | Mod: CPTII,S$GLB,, | Performed by: OPHTHALMOLOGY

## 2023-10-31 PROCEDURE — 1159F MED LIST DOCD IN RCRD: CPT | Mod: CPTII,S$GLB,, | Performed by: OPHTHALMOLOGY

## 2023-10-31 PROCEDURE — 1160F RVW MEDS BY RX/DR IN RCRD: CPT | Mod: CPTII,S$GLB,, | Performed by: OPHTHALMOLOGY

## 2023-10-31 PROCEDURE — 1101F PT FALLS ASSESS-DOCD LE1/YR: CPT | Mod: CPTII,S$GLB,, | Performed by: OPHTHALMOLOGY

## 2023-10-31 PROCEDURE — 1126F PR PAIN SEVERITY QUANTIFIED, NO PAIN PRESENT: ICD-10-PCS | Mod: CPTII,S$GLB,, | Performed by: OPHTHALMOLOGY

## 2023-10-31 PROCEDURE — 1160F PR REVIEW ALL MEDS BY PRESCRIBER/CLIN PHARMACIST DOCUMENTED: ICD-10-PCS | Mod: CPTII,S$GLB,, | Performed by: OPHTHALMOLOGY

## 2023-10-31 PROCEDURE — 92134 CPTRZ OPH DX IMG PST SGM RTA: CPT | Mod: S$GLB,,, | Performed by: OPHTHALMOLOGY

## 2023-10-31 PROCEDURE — 92134 OCT, RETINA - OU - BOTH EYES: ICD-10-PCS | Mod: S$GLB,,, | Performed by: OPHTHALMOLOGY

## 2023-10-31 PROCEDURE — 1126F AMNT PAIN NOTED NONE PRSNT: CPT | Mod: CPTII,S$GLB,, | Performed by: OPHTHALMOLOGY

## 2023-10-31 PROCEDURE — 1159F PR MEDICATION LIST DOCUMENTED IN MEDICAL RECORD: ICD-10-PCS | Mod: CPTII,S$GLB,, | Performed by: OPHTHALMOLOGY

## 2023-10-31 PROCEDURE — 92014 PR EYE EXAM, EST PATIENT,COMPREHESV: ICD-10-PCS | Mod: S$GLB,,, | Performed by: OPHTHALMOLOGY

## 2023-10-31 PROCEDURE — 3288F PR FALLS RISK ASSESSMENT DOCUMENTED: ICD-10-PCS | Mod: CPTII,S$GLB,, | Performed by: OPHTHALMOLOGY

## 2023-10-31 PROCEDURE — 92014 COMPRE OPH EXAM EST PT 1/>: CPT | Mod: S$GLB,,, | Performed by: OPHTHALMOLOGY

## 2023-10-31 PROCEDURE — 99999 PR PBB SHADOW E&M-EST. PATIENT-LVL III: CPT | Mod: PBBFAC,,, | Performed by: OPHTHALMOLOGY

## 2023-10-31 PROCEDURE — 3288F FALL RISK ASSESSMENT DOCD: CPT | Mod: CPTII,S$GLB,, | Performed by: OPHTHALMOLOGY

## 2023-10-31 NOTE — PROGRESS NOTES
HPI    4-6 wk DFE/OCTm OU/Likely Eylea OS    DLS 10/03/2023 by Dr. RUBEN Norton MD    Cc: pt reports: no new changes       --Blurred Va  -Diplopia OD  --Eye Pain   --Flashes/Floaters  --Headaches  --Curtains/Shadows/Veils    Eye Meds: AT's     POHx:   1.Exudative age-related macular degeneration of left eye with active   choroidal neovascularization      S/P SLY in the past/ KENISHA x 2 02/15/23   S/P Eylea OS 03/16/2023      2. Exudative age-related macular degeneration of right eye with inactive   choroidal neovascularization     3. Hypertensive retinopathy, bilateral     4. Pseudophakia of both eyes   5. Central Atrophy OD          Last edited by Edilma Keller MA on 10/31/2023 11:03 AM.          A/P    ICD-10-CM ICD-9-CM   1. Exudative age-related macular degeneration of left eye with active choroidal neovascularization  H35.3221 362.52     362.16   2. Exudative age-related macular degeneration of right eye with inactive choroidal neovascularization  H35.3212 362.52     362.16   3. Hypertensive retinopathy, bilateral  H35.033 362.11   4. Pseudophakia of both eyes  Z96.1 V43.1   5. Vitreous degeneration of both eyes  H43.813 379.21           1. Exudative age-related macular degeneration of left eye with active choroidal neovascularization  2. 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 Mendez, transitioning care to [facility]    OD: 20/60 ecc (stable), Dry changes , stable, central atrophy OD  Plan: Observe, inactive CNVM , no injection, given atrophy also do not recommend Syfovre as pt has hx wet AMD and uncertain if CNVM activity re-activation is possibility     OS: s/p SLY in past  S/p KENISHA X3 3/16/23   S/p SLY x2 4/27/23  VA 20/25 (stable), SRF resolved, PED  Plan:  dry today, last injection was April, monocular, will observe and bring back 3 mo, pt will call if having symptoms and if needs injections ooner     3. Hypertensive retinopathy,  bilateral  PCP Aramis Barakat MD - notes reviewed  01/12/2022  5.5  A1C   Mild HTN changes  Plan: Observation  Recommend good blood pressure control, tight blood glucose control, and good cholesterol control     4. Pseudophakia of both eyes  Good lens position OU  Plan: Observation, update Mrx   Monocular precautions     5. Vitreous degeneration of both eyes  No RT/RD  Plan: Observation            RTC 3 mo DFE/OCTm OU, poss Eylea OS     I saw and examined the patient and reviewed in detail the findings documented. The final examination findings, image interpretations, and plan as documented in the record represent my personal judgment and conclusions.    Beto Nortno MD  Vitreoretinal Surgery   [facility]

## 2023-12-11 PROBLEM — I95.9 HYPOTENSION: Status: ACTIVE | Noted: 2023-12-11

## 2023-12-11 PROBLEM — M19.011 ARTHROPATHY OF RIGHT SHOULDER: Status: ACTIVE | Noted: 2023-12-11

## 2023-12-11 PROBLEM — Z00.00 PREVENTATIVE HEALTH CARE: Status: ACTIVE | Noted: 2023-12-11

## 2023-12-11 PROBLEM — Z00.00 PREVENTATIVE HEALTH CARE: Status: RESOLVED | Noted: 2023-12-11 | Resolved: 2023-12-11

## 2024-01-29 NOTE — PROGRESS NOTES
HPI    DLS 10/31/2023 Dr. Norton    Patient states her his vision has been stable since his last visit. Denies   flashes,floaters, or pain.      Eye Meds: AT's     POHx:   1.Exudative age-related macular degeneration of left eye with active   choroidal neovascularization      S/P SLY in the past/ KENISHA x 2 02/15/23   S/P Eylea OS 03/16/2023      2. Exudative age-related macular degeneration of right eye with inactive   choroidal neovascularization     3. Hypertensive retinopathy, bilateral     4. Pseudophakia of both eyes   5. Central Atrophy OD          Last edited by Guera Jara on 1/30/2024 10:37 AM.           A/P    ICD-10-CM ICD-9-CM   1. Exudative age-related macular degeneration of left eye with active choroidal neovascularization  H35.3221 362.52     362.16   2. Exudative age-related macular degeneration of right eye with inactive choroidal neovascularization  H35.3212 362.52     362.16   3. Hypertensive retinopathy, bilateral  H35.033 362.11   4. Pseudophakia of both eyes  Z96.1 V43.1   5. Vitreous degeneration of both eyes  H43.813 379.21       1. Exudative age-related macular degeneration of left eye with active choroidal neovascularization  2. 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 Mendez, transitioned care to [facility]    OD: 20/80 ecc (was 20/60), Drystable, central atrophy OD  Plan: Observe, inactive CNVM , no injection, given atrophy can consider Syfovre in future, monitor for now     OS: s/p SLY in past  S/p KENISHA X3 3/16/23   S/p SLY x2 4/27/23  VA 20/25 (stable), SRF recurrent   Plan:  last injection was April 2023, now with recurrent SRF, recommend Eylea OS for control of CNVM in better seeing eye   Based on todays exam, diagnostic studies, and review of records, the determination was made for treatment today.  Schedule Eylea Injection Left Eye today Patient chooses to proceed with injection R/B/A discussed include  infection retinal detachment and stroke    Patient identified.  Timeout performed.    Risks, benefits, and alternatives to treatment were discussed in detail with the patient, including bleeding/infection (endophthalmitis)/etc.  The patient voiced understanding and wished to proceed with the procedure.  See separate consent form.    Injection Procedure Note:  Diagnosis: CNVM Left Eye    Topical Proparacaine drop placed then topical 5% Betadine, then subcojunctival lidocaine 2% injection  Sterile gloves used, and sterile lid speculum placed.  5% Betadine placed at injection site again prior to injection.  Eylea 2mg in 0.05cc Injected inferotemporally 3.5-4mm posterior to the limbus.  Complications: None  Va at least CF at 5 feet post injection.  Retina, ONH, IOP normal after injection.    Followup as below.  Patient should return immediately PRN.  Retinal Detachment and Endophthalmitis precautions given     3. Hypertensive retinopathy, bilateral  PCP Aramis Barakat MD - notes reviewed  01/12/2024  5.3  A1C   Mild HTN changes  Plan: Observation  Recommend good blood pressure control, tight blood glucose control, and good cholesterol control     4. Pseudophakia of both eyes  Good lens position OU  Plan: Observation, update Mrx   Monocular precautions     5. Vitreous degeneration of both eyes  No RT/RD, few floaters  Plan: Observation            RTC 3 mo DFE/OCTm OU, poss Eylea OS     I saw and examined the patient and reviewed in detail the findings documented. The final examination findings, image interpretations, and plan as documented in the record represent my personal judgment and conclusions.    Beto Norton MD  Vitreoretinal Surgery   [facility]

## 2024-01-30 ENCOUNTER — OFFICE VISIT (OUTPATIENT)
Dept: OPHTHALMOLOGY | Facility: CLINIC | Age: 79
End: 2024-01-30
Payer: MEDICARE

## 2024-01-30 DIAGNOSIS — H35.033 HYPERTENSIVE RETINOPATHY, BILATERAL: ICD-10-CM

## 2024-01-30 DIAGNOSIS — H43.813 VITREOUS DEGENERATION OF BOTH EYES: ICD-10-CM

## 2024-01-30 DIAGNOSIS — Z96.1 PSEUDOPHAKIA OF BOTH EYES: ICD-10-CM

## 2024-01-30 DIAGNOSIS — H35.3221 EXUDATIVE AGE-RELATED MACULAR DEGENERATION OF LEFT EYE WITH ACTIVE CHOROIDAL NEOVASCULARIZATION: Primary | ICD-10-CM

## 2024-01-30 DIAGNOSIS — H35.3212 EXUDATIVE AGE-RELATED MACULAR DEGENERATION OF RIGHT EYE WITH INACTIVE CHOROIDAL NEOVASCULARIZATION: ICD-10-CM

## 2024-01-30 PROCEDURE — 1101F PT FALLS ASSESS-DOCD LE1/YR: CPT | Mod: CPTII,S$GLB,, | Performed by: OPHTHALMOLOGY

## 2024-01-30 PROCEDURE — 92134 CPTRZ OPH DX IMG PST SGM RTA: CPT | Mod: S$GLB,,, | Performed by: OPHTHALMOLOGY

## 2024-01-30 PROCEDURE — 67028 INJECTION EYE DRUG: CPT | Mod: LT,S$GLB,, | Performed by: OPHTHALMOLOGY

## 2024-01-30 PROCEDURE — 99214 OFFICE O/P EST MOD 30 MIN: CPT | Mod: 25,S$GLB,, | Performed by: OPHTHALMOLOGY

## 2024-01-30 PROCEDURE — 99999 PR PBB SHADOW E&M-EST. PATIENT-LVL III: CPT | Mod: PBBFAC,,, | Performed by: OPHTHALMOLOGY

## 2024-01-30 PROCEDURE — 1159F MED LIST DOCD IN RCRD: CPT | Mod: CPTII,S$GLB,, | Performed by: OPHTHALMOLOGY

## 2024-01-30 PROCEDURE — 3288F FALL RISK ASSESSMENT DOCD: CPT | Mod: CPTII,S$GLB,, | Performed by: OPHTHALMOLOGY

## 2024-01-30 PROCEDURE — 1160F RVW MEDS BY RX/DR IN RCRD: CPT | Mod: CPTII,S$GLB,, | Performed by: OPHTHALMOLOGY

## 2024-01-30 PROCEDURE — 1126F AMNT PAIN NOTED NONE PRSNT: CPT | Mod: CPTII,S$GLB,, | Performed by: OPHTHALMOLOGY

## 2024-02-21 ENCOUNTER — OFFICE VISIT (OUTPATIENT)
Dept: UROLOGY | Facility: CLINIC | Age: 79
End: 2024-02-21
Payer: MEDICARE

## 2024-02-21 VITALS — BODY MASS INDEX: 27.99 KG/M2 | WEIGHT: 174.19 LBS | HEIGHT: 66 IN

## 2024-02-21 DIAGNOSIS — R97.20 ELEVATED PSA: Primary | ICD-10-CM

## 2024-02-21 DIAGNOSIS — N40.0 BPH WITHOUT URINARY OBSTRUCTION: ICD-10-CM

## 2024-02-21 LAB
BILIRUBIN, UA POC OHS: NEGATIVE
BLOOD, UA POC OHS: NEGATIVE
CLARITY, UA POC OHS: CLEAR
COLOR, UA POC OHS: YELLOW
GLUCOSE, UA POC OHS: NEGATIVE
KETONES, UA POC OHS: NEGATIVE
LEUKOCYTES, UA POC OHS: NEGATIVE
NITRITE, UA POC OHS: NEGATIVE
PH, UA POC OHS: 6
PROTEIN, UA POC OHS: NEGATIVE
SPECIFIC GRAVITY, UA POC OHS: 1.01
UROBILINOGEN, UA POC OHS: 0.2

## 2024-02-21 PROCEDURE — 1126F AMNT PAIN NOTED NONE PRSNT: CPT | Mod: CPTII,S$GLB,, | Performed by: UROLOGY

## 2024-02-21 PROCEDURE — 81003 URINALYSIS AUTO W/O SCOPE: CPT | Mod: QW,S$GLB,, | Performed by: UROLOGY

## 2024-02-21 PROCEDURE — 1159F MED LIST DOCD IN RCRD: CPT | Mod: CPTII,S$GLB,, | Performed by: UROLOGY

## 2024-02-21 PROCEDURE — 99203 OFFICE O/P NEW LOW 30 MIN: CPT | Mod: S$GLB,,, | Performed by: UROLOGY

## 2024-02-21 PROCEDURE — 1101F PT FALLS ASSESS-DOCD LE1/YR: CPT | Mod: CPTII,S$GLB,, | Performed by: UROLOGY

## 2024-02-21 PROCEDURE — 99999 PR PBB SHADOW E&M-EST. PATIENT-LVL III: CPT | Mod: PBBFAC,,, | Performed by: UROLOGY

## 2024-02-21 PROCEDURE — 3288F FALL RISK ASSESSMENT DOCD: CPT | Mod: CPTII,S$GLB,, | Performed by: UROLOGY

## 2024-02-21 NOTE — PROGRESS NOTES
Subjective:       Patient ID: Jean Claude Menard is a 79 y.o. male.    Chief Complaint: Elevated PSA    HPI    79-year-old with elevated PSA.  His most recent PSA is 5.1.  He has no bothersome urinary symptoms.  He does take over-the-counter prostate supplements but no other BPH medications.  He denies hematuria and dysuria.  He has no family history of prostate cancer.  We discussed screening PSA and its limitation.  We discussed the possibility of early stage prostate cancer.  We discussed prostate needle biopsy.  We discussed the controversy regarding prostate cancer screening late in life.  Urine dipstick shows negative for all components.     Prostate Specific Antigen   Latest Ref Rng 0.00 - 4.00 ng/mL   1/12/2022 4.5 (H)    11/8/2022 3.6    1/12/2024 5.1 (H)        Past Medical History:   Diagnosis Date    b Chronic Hypotension     b Stage 3 Chronic Kidney Disease ###    c Hypercholesterolemia     12/5/22 RXd Crestor 20 Mg qHS    d Family H/O Diabetes Mellitus     i H/O 1/2 PPD X 25 Years TUD, Quit In 1982     i H/O COVID-19 Infection     His 11/14/22 COVID-19 Home Test = Was Positive    k Elevated PSA Levels ###    1/17/22 Referred To Dr. Delgado landers H/O Ectopic Testicle Surgery     l Gouty Arthropathy     l Right Shoulder Arthropathy     12/11/23 Referred To Dr. Ed baird Borderline Vitamin D Deficiency     12/5/22 RXd OTC D3 2K IU Daily    Wellness Visit 12/11/2023      Past Surgical History:   Procedure Laterality Date    Eye Injections Left     TESTICLE SURGERY      ectpic surgery       Current Outpatient Medications:     cholecalciferol, vitamin D3, (VITAMIN D3) 50 mcg (2,000 unit) Tab, Take 1 tablet (2,000 Units total) by mouth once daily., Disp: , Rfl:     coenzyme Q10 (CO Q-10) 100 mg capsule, Take 100 mg by mouth once daily., Disp: , Rfl:     febuxostat (ULORIC) 40 mg Tab, Take 1 tablet (40 mg total) by mouth once daily., Disp: 90 tablet, Rfl: 3    glucosam/chond/collagen/hyalur (JOINT  SUPPORT ORAL), Take by mouth., Disp: , Rfl:     magnesium 30 mg Tab, Take by mouth once., Disp: , Rfl:     multivitamin (THERAGRAN) per tablet, Take 1 tablet by mouth once daily., Disp: , Rfl:     rosuvastatin (CRESTOR) 10 MG tablet, Take 1 tablet (10 mg total) by mouth every evening., Disp: 90 tablet, Rfl: 3    UNABLE TO FIND, Prostate Support, Disp: , Rfl:     vit C/E/Zn/coppr/lutein/zeaxan (PRESERVISION AREDS-2 ORAL), , Disp: , Rfl:     Review of Systems   Constitutional:  Negative for fever.   Genitourinary:  Negative for dysuria and hematuria.       Objective:      Physical Exam  Vitals reviewed.   Constitutional:       Appearance: He is well-developed.   Pulmonary:      Effort: Pulmonary effort is normal.   Genitourinary:     Prostate: Enlarged (60g). Not tender and no nodules present.   Skin:     Findings: No rash.   Neurological:      Mental Status: He is alert and oriented to person, place, and time.         Assessment:       1. Elevated PSA    2. BPH without urinary obstruction        Plan:       Elevated PSA  -     POCT Urinalysis(Instrument)    BPH without urinary obstruction      His PSA is age adjusted in the normal range.  I recommend continued observation.  Keep follow-up with primary care doctor.

## 2024-03-12 ENCOUNTER — OFFICE VISIT (OUTPATIENT)
Dept: DERMATOLOGY | Facility: CLINIC | Age: 79
End: 2024-03-12
Payer: MEDICARE

## 2024-03-12 DIAGNOSIS — D48.5 NEOPLASM OF UNCERTAIN BEHAVIOR OF SKIN: Primary | ICD-10-CM

## 2024-03-12 DIAGNOSIS — L73.8 SEBACEOUS HYPERPLASIA: ICD-10-CM

## 2024-03-12 PROCEDURE — 88305 TISSUE EXAM BY PATHOLOGIST: CPT | Mod: 26,,, | Performed by: DERMATOLOGY

## 2024-03-12 PROCEDURE — 1101F PT FALLS ASSESS-DOCD LE1/YR: CPT | Mod: CPTII,S$GLB,, | Performed by: STUDENT IN AN ORGANIZED HEALTH CARE EDUCATION/TRAINING PROGRAM

## 2024-03-12 PROCEDURE — 1160F RVW MEDS BY RX/DR IN RCRD: CPT | Mod: CPTII,S$GLB,, | Performed by: STUDENT IN AN ORGANIZED HEALTH CARE EDUCATION/TRAINING PROGRAM

## 2024-03-12 PROCEDURE — 99202 OFFICE O/P NEW SF 15 MIN: CPT | Mod: 25,S$GLB,, | Performed by: STUDENT IN AN ORGANIZED HEALTH CARE EDUCATION/TRAINING PROGRAM

## 2024-03-12 PROCEDURE — 11102 TANGNTL BX SKIN SINGLE LES: CPT | Mod: S$GLB,,, | Performed by: STUDENT IN AN ORGANIZED HEALTH CARE EDUCATION/TRAINING PROGRAM

## 2024-03-12 PROCEDURE — 3288F FALL RISK ASSESSMENT DOCD: CPT | Mod: CPTII,S$GLB,, | Performed by: STUDENT IN AN ORGANIZED HEALTH CARE EDUCATION/TRAINING PROGRAM

## 2024-03-12 PROCEDURE — 1126F AMNT PAIN NOTED NONE PRSNT: CPT | Mod: CPTII,S$GLB,, | Performed by: STUDENT IN AN ORGANIZED HEALTH CARE EDUCATION/TRAINING PROGRAM

## 2024-03-12 PROCEDURE — 1159F MED LIST DOCD IN RCRD: CPT | Mod: CPTII,S$GLB,, | Performed by: STUDENT IN AN ORGANIZED HEALTH CARE EDUCATION/TRAINING PROGRAM

## 2024-03-12 PROCEDURE — 88305 TISSUE EXAM BY PATHOLOGIST: CPT | Performed by: DERMATOLOGY

## 2024-03-12 NOTE — PATIENT INSTRUCTIONS
Shave Biopsy Wound Care    Your doctor has performed a shave biopsy today.  A band aid and vaseline ointment has been placed over the site.  This should remain in place for 24 hours.  It is recommended that you keep the area dry for the first 24 hours.  After 24 hours, you may remove the band aid and wash the area with warm soap and water and apply Vaseline jelly.  Many patients prefer to use Neosporin or Bacitracin ointment.  This is acceptable; however, know that you can develop an allergy to this medication even if you have used it safely for years.  It is important to keep the area moist.  Letting it dry out and get air slows healing time, and will worsen the scar.  Band aid is optional after first 24 hours.      If you notice increasing redness, tenderness, pain, or yellow drainage at the biopsy site, please notify your doctor.  These are signs of an infection.    If your biopsy site is bleeding, apply firm pressure for 15 minutes straight.  Repeat for another 15 minutes, if it is still bleeding.   If the surgical site continues to bleed, then please contact your doctor.      [facility] [place] [address]/ [phone] FAX/ [email or web address]

## 2024-03-12 NOTE — PROGRESS NOTES
Subjective:      Patient ID:  Jean Claude Menard is a 79 y.o. male who presents for   Chief Complaint   Patient presents with    Spot     Left neck, and forehead     New Patient    Patient is coming in today for spot on his left neck x's two years. States that it has become smaller and it gets in the way  when he shaves.   Patient also has a spot on his forehead that he is concerned about.     Derm Hx   Denies Phx NMSC  Denies Fhx MM      Review of Systems   Constitutional:  Negative for fever, chills and fatigue.   Respiratory:  Negative for cough and shortness of breath.    Gastrointestinal:  Negative for nausea, vomiting and diarrhea.   Skin:  Positive for activity-related sunscreen use. Negative for wears hat.   Hematologic/Lymphatic: Does not bruise/bleed easily.       Objective:   Physical Exam   Constitutional: He appears well-developed and well-nourished.   Neurological: He is alert and oriented to person, place, and time.   Psychiatric: He has a normal mood and affect.   Skin:   Areas Examined (abnormalities noted in diagram):   Head / Face Inspection Performed  Neck Inspection Performed            Diagram Legend     Erythematous scaling macule/papule c/w actinic keratosis       Vascular papule c/w angioma      Pigmented verrucoid papule/plaque c/w seborrheic keratosis      Yellow umbilicated papule c/w sebaceous hyperplasia      Irregularly shaped tan macule c/w lentigo     1-2 mm smooth white papules consistent with Milia      Movable subcutaneous cyst with punctum c/w epidermal inclusion cyst      Subcutaneous movable cyst c/w pilar cyst      Firm pink to brown papule c/w dermatofibroma      Pedunculated fleshy papule(s) c/w skin tag(s)      Evenly pigmented macule c/w junctional nevus     Mildly variegated pigmented, slightly irregular-bordered macule c/w mildly atypical nevus      Flesh colored to evenly pigmented papule c/w intradermal nevus       Pink pearly papule/plaque c/w basal cell carcinoma       Erythematous hyperkeratotic cursted plaque c/w SCC      Surgical scar with no sign of skin cancer recurrence      Open and closed comedones      Inflammatory papules and pustules      Verrucoid papule consistent consistent with wart     Erythematous eczematous patches and plaques     Dystrophic onycholytic nail with subungual debris c/w onychomycosis     Umbilicated papule    Erythematous-base heme-crusted tan verrucoid plaque consistent with inflamed seborrheic keratosis     Erythematous Silvery Scaling Plaque c/w Psoriasis     See annotation      Assessment / Plan:      Pathology Orders:       Normal Orders This Visit    Specimen to Pathology, Dermatology     Questions:    Procedure Type: Dermatology and skin neoplasms    Number of Specimens: 1    ------------------------: -------------------------    Spec 1 Procedure: Biopsy    Spec 1 Clinical Impression: BCC    Spec 1 Source: left neck    Release to patient:           Neoplasm of uncertain behavior of skin  -     Specimen to Pathology, Dermatology  Shave biopsy procedure note:    Shave biopsy performed after verbal consent including risk of infection, scar, recurrence, need for additional treatment of site. Area prepped with alcohol, anesthetized with approximately 1.0cc of 1% lidocaine with epinephrine. Lesional tissue shaved with razor blade. Hemostasis achieved with application of aluminum chloride followed by hyfrecation. No complications. Dressing applied. Wound care explained.    Sebaceous hyperplasia  This is a common condition representing benign enlargement of the sebaceous lobule. It typically occurs in adulthood. Reassurance given to patient.     Declines skin check today, will schedule for uBSE when he returns from [facility]           No follow-ups on file.

## 2024-03-15 LAB
FINAL PATHOLOGIC DIAGNOSIS: NORMAL
GROSS: NORMAL
Lab: NORMAL
MICROSCOPIC EXAM: NORMAL

## 2024-03-18 ENCOUNTER — TELEPHONE (OUTPATIENT)
Dept: DERMATOLOGY | Facility: CLINIC | Age: 79
End: 2024-03-18
Payer: MEDICARE

## 2024-03-18 DIAGNOSIS — C44.41 BASAL CELL CARCINOMA (BCC) OF LEFT SIDE OF NECK: Primary | ICD-10-CM

## 2024-03-20 DIAGNOSIS — C44.41 BASAL CELL CARCINOMA, SCALP/NECK: Primary | ICD-10-CM

## 2024-03-26 ENCOUNTER — PROCEDURE VISIT (OUTPATIENT)
Dept: DERMATOLOGY | Facility: CLINIC | Age: 79
End: 2024-03-26
Payer: MEDICARE

## 2024-03-26 VITALS
BODY MASS INDEX: 24.44 KG/M2 | WEIGHT: 165 LBS | HEIGHT: 69 IN | HEART RATE: 54 BPM | DIASTOLIC BLOOD PRESSURE: 63 MMHG | SYSTOLIC BLOOD PRESSURE: 136 MMHG

## 2024-03-26 DIAGNOSIS — C44.41 BASAL CELL CARCINOMA, SCALP/NECK: ICD-10-CM

## 2024-03-26 PROCEDURE — 13132 CMPLX RPR F/C/C/M/N/AX/G/H/F: CPT | Mod: S$GLB,,, | Performed by: DERMATOLOGY

## 2024-03-26 PROCEDURE — 17311 MOHS 1 STAGE H/N/HF/G: CPT | Mod: 51,S$GLB,, | Performed by: DERMATOLOGY

## 2024-03-26 NOTE — PROGRESS NOTES
MOHS MICROGRAPHIC SURGERY OPERATIVE NOTE  Name:  Jean Claude Menard  Date: 3/26/2024  Patient : 1945  Attending Surgeon: Darrin Rodriguez MD  Assistants: Swetha Petersen - Surgical Technician  Anesthetic Agent: 1% lidocaine with 1:100,000 epinephrine  Clinical Diagnosis: basal cell carcinoma - nodular  Operation: Mohs Micrographic Surgery  Location: left neck  Indications: Biopsy-proven skin cancer of cosmetically and functionally important areas, including head, neck, genital, hand, foot, or areas known for having difficulty in healing, such as the lower anterior legs.  Surgical Preparation: povidone-iodine     Description of Operation:  The nature and purpose of the procedure, associated risks and alternative treatments were explained to the patient in detail. All patient questions were answered completely. An informed operative consent and photography permit were obtained. The tumor location was then identified and marked with agreement by the patient of the correct location. The patient was positioned, prepped, and draped in the usual sterile manner. Local anesthesia was obtained with 2 cc(s) of 1% lidocaine with 1:100,000 epinephrine. The lesion pre-operatively measures 0.8 by 0.5 cm.     1ST STAGE:  A 2+ mm rim of normal appearing skin was marked circumferentially around the lesion after scraping with a curette to define the margin. The area thus outlined was excised at a 45 degree angle. Hemostasis was obtained with electrodesiccation. The specimen was oriented, mapped, and subdivided into at least two sections. Sections were then chromacoded and submitted for horizontal frozen sections. The patient tolerated the procedure well and there were no complications. Upon microscopic examination of the processed horizontal frozen sections of this stage:  No residual tumor was noted.  Tumor type noted on stage 1: No tumor seen.     SUMMARY:  The tumor was extirpated in 1 Mohs Stage resulting in a final  defect measuring 1.0 by 0.8 cm².   The final defect extended deep to subcutaneous fat  The patient tolerated the procedure well and no complications were noted.     PHOTOS:      Darrin Rodriguez MD    Complex Linear Closure Operative Note  Name: Jean Claude Menard  YOB: 1945  Date: 3/26/2024  Attending Surgeon: Darrin Rodriguez MD FAAD  Assistants:  Swetha Petersen - Surgical Technician  Clinical Diagnosis: A 1.0 by 0.8 cm defect secondary to Mohs Micrographic Surgery  Location: left neck  Operation: Complex linear closure  Surgical Preparation: broad scrub with povidone-iodine    Description of Operation:  The nature and purpose of the procedure, associated risks and alternative treatments were explained to the patient in detail. All patient questions were answered completely. In order to minimize tension on the closure and avoid compromising the anatomic contour of the cosmetic region and maximize functional capacity, a complex linear closure was performed. An informed operative consent and photography permit were obtained. The patient was positioned, prepped, and draped in the usual sterile manner. Local anesthesia was obtained with 5 cc(s) of 1% lidocaine with 1:100,000 epinephrine.    The defect edges were debeveled with a scalpel blade. The circular defect was widely undermined in the deep subcutaneous plane at least 100% of the defect diameter to allow for maximum tissue movement. Hemostasis was achieved with spot electrodessication. The defect was closed first utilizing multiple 4-0 Monocryl interrupted buried subcutaneous sutures. This resulted in excellent apposition of the dermis and epidermis, however two redundant areas of tissue were created on opposite sides of the defect. Utilizing a #15 scalpel blade, two Burows triangles were excised to ensure a flat scar. Hemostasis was obtained with spot electrodessication. The skin edges throughout further fastened superficially with 5-0  Prolene. The final length of the repair was 3.7 cm. The patient tolerated the procedure well and there were no complications.    Polysporin, non-adherent gauze and a pressure dressing were applied to wound after gentle cleansing with saline.    Patient instructed in and provided with instructions for post-op care, will RTC in 10 days for suture removal    Post op meds: None    Photos:      MD LAURA Gamboa

## 2024-04-05 ENCOUNTER — CLINICAL SUPPORT (OUTPATIENT)
Dept: DERMATOLOGY | Facility: CLINIC | Age: 79
End: 2024-04-05
Payer: MEDICARE

## 2024-04-05 DIAGNOSIS — Z48.02 VISIT FOR SUTURE REMOVAL: Primary | ICD-10-CM

## 2024-04-05 PROCEDURE — 99024 POSTOP FOLLOW-UP VISIT: CPT | Mod: S$GLB,,, | Performed by: DERMATOLOGY

## 2024-04-05 NOTE — PROGRESS NOTES
Mohs Surgery Suture Removal Note   Patient Name: Jean Claude Menard  Patient : 1945  Date of Service: 2024    CC: Pt. Presents for removal of sutures on the left neck today  Subjective: patient reports wound healing as expected     Objective: Wound well healed, sutures clean/dry/intact. No evidence of any complications noted.     Visit For Suture Removal:  - Suture removal today  - Steri strips/mastisol were not applied  - Patient counseled on silicone gel/silicone sheeting and their use in the management of post-operative scars  - Handout on silicone gel/silicone gel sheeting was provided  - Patient to follow up with their referring provider in 3 months for further skin evaluation    Heidi Villanueva

## 2024-04-11 ENCOUNTER — TELEPHONE (OUTPATIENT)
Dept: DERMATOLOGY | Facility: CLINIC | Age: 79
End: 2024-04-11
Payer: MEDICARE

## 2024-04-11 NOTE — TELEPHONE ENCOUNTER
----- Message from Sivan Montemayor, Patient Care Assistant sent at 4/11/2024  1:45 PM CDT -----  Type: Needs Medical Advice  Who Called:  patrick  Aurelio Call Back Number: [phone]    Additional Information: patrick is calling to state that he has his stiches taken out 2 weeks ago and the surgical  site is inflamed and red , please call to further discuss thank you .

## 2024-04-15 ENCOUNTER — OFFICE VISIT (OUTPATIENT)
Dept: DERMATOLOGY | Facility: CLINIC | Age: 79
End: 2024-04-15
Payer: MEDICARE

## 2024-04-15 DIAGNOSIS — Z51.89 VISIT FOR WOUND CHECK: Primary | ICD-10-CM

## 2024-04-15 PROCEDURE — 99499 UNLISTED E&M SERVICE: CPT | Mod: S$GLB,,, | Performed by: DERMATOLOGY

## 2024-04-17 NOTE — PROGRESS NOTES
Dermatologic Surgery Wound Check Note  Name: Jean Claude Menard   Date: 4/15/2024  Patient : 1945    Attending Surgeon: Darrin Rodriguez MD FAAD  Assistants: Heidi Villanueva - Medical Assistant  Location: left anterior neck    Subjective: Patient presents today for wound check on the left anterior neck. Patient reports irritation possible spitting suture material.     Objective:  Physical Exam   Head            Assessment and plan:    There are no diagnoses linked to this encounter.        Darrin Rodriguez MD

## 2024-04-25 ENCOUNTER — OFFICE VISIT (OUTPATIENT)
Dept: DERMATOLOGY | Facility: CLINIC | Age: 79
End: 2024-04-25
Payer: MEDICARE

## 2024-04-25 DIAGNOSIS — D48.5 NEOPLASM OF UNCERTAIN BEHAVIOR OF SKIN: Primary | ICD-10-CM

## 2024-04-25 DIAGNOSIS — D22.9 MULTIPLE BENIGN NEVI: ICD-10-CM

## 2024-04-25 DIAGNOSIS — D18.01 CHERRY ANGIOMA: ICD-10-CM

## 2024-04-25 DIAGNOSIS — L73.8 SEBACEOUS HYPERPLASIA: ICD-10-CM

## 2024-04-25 DIAGNOSIS — L82.1 SEBORRHEIC KERATOSIS: ICD-10-CM

## 2024-04-25 DIAGNOSIS — D23.60: ICD-10-CM

## 2024-04-25 DIAGNOSIS — L90.5 SCAR: ICD-10-CM

## 2024-04-25 DIAGNOSIS — Z85.828 HISTORY OF NONMELANOMA SKIN CANCER: ICD-10-CM

## 2024-04-25 DIAGNOSIS — D17.1 LIPOMA OF SKIN AND SUBCUTANEOUS TISSUE OF TRUNK: ICD-10-CM

## 2024-04-25 PROCEDURE — 3288F FALL RISK ASSESSMENT DOCD: CPT | Mod: CPTII,S$GLB,, | Performed by: STUDENT IN AN ORGANIZED HEALTH CARE EDUCATION/TRAINING PROGRAM

## 2024-04-25 PROCEDURE — 99213 OFFICE O/P EST LOW 20 MIN: CPT | Mod: 25,S$GLB,, | Performed by: STUDENT IN AN ORGANIZED HEALTH CARE EDUCATION/TRAINING PROGRAM

## 2024-04-25 PROCEDURE — 88305 TISSUE EXAM BY PATHOLOGIST: CPT | Mod: 26,,, | Performed by: DERMATOLOGY

## 2024-04-25 PROCEDURE — 1126F AMNT PAIN NOTED NONE PRSNT: CPT | Mod: CPTII,S$GLB,, | Performed by: STUDENT IN AN ORGANIZED HEALTH CARE EDUCATION/TRAINING PROGRAM

## 2024-04-25 PROCEDURE — 1160F RVW MEDS BY RX/DR IN RCRD: CPT | Mod: CPTII,S$GLB,, | Performed by: STUDENT IN AN ORGANIZED HEALTH CARE EDUCATION/TRAINING PROGRAM

## 2024-04-25 PROCEDURE — 1101F PT FALLS ASSESS-DOCD LE1/YR: CPT | Mod: CPTII,S$GLB,, | Performed by: STUDENT IN AN ORGANIZED HEALTH CARE EDUCATION/TRAINING PROGRAM

## 2024-04-25 PROCEDURE — 1159F MED LIST DOCD IN RCRD: CPT | Mod: CPTII,S$GLB,, | Performed by: STUDENT IN AN ORGANIZED HEALTH CARE EDUCATION/TRAINING PROGRAM

## 2024-04-25 PROCEDURE — 11102 TANGNTL BX SKIN SINGLE LES: CPT | Mod: S$GLB,,, | Performed by: STUDENT IN AN ORGANIZED HEALTH CARE EDUCATION/TRAINING PROGRAM

## 2024-04-25 PROCEDURE — 88305 TISSUE EXAM BY PATHOLOGIST: CPT | Performed by: DERMATOLOGY

## 2024-04-25 NOTE — PATIENT INSTRUCTIONS
Shave Biopsy Wound Care    Your doctor has performed a shave biopsy today.  A band aid and vaseline ointment has been placed over the site.  This should remain in place for 24 hours.  It is recommended that you keep the area dry for the first 24 hours.  After 24 hours, you may remove the band aid and wash the area with warm soap and water and apply Vaseline jelly.  Many patients prefer to use Neosporin or Bacitracin ointment.  This is acceptable; however, know that you can develop an allergy to this medication even if you have used it safely for years.  It is important to keep the area moist.  Letting it dry out and get air slows healing time, and will worsen the scar.  Band aid is optional after first 24 hours.      If you notice increasing redness, tenderness, pain, or yellow drainage at the biopsy site, please notify your doctor.  These are signs of an infection.    If your biopsy site is bleeding, apply firm pressure for 15 minutes straight.  Repeat for another 15 minutes, if it is still bleeding.   If the surgical site continues to bleed, then please contact your doctor.      [facility] [place] [address]/ [phone] FAX/ [email or web address]

## 2024-04-25 NOTE — PROGRESS NOTES
Subjective:      Patient ID:  Jean Claude Menard is a 79 y.o. male who presents for   Chief Complaint   Patient presents with    Skin Check     TBSC     LOV 03/12/2024    Patient is coming in today for TBSC. No areas of concern.     Last Path  Final Pathologic Diagnosis 1. Skin, left neck, shave biopsy:--[facility] Dr. K   - BASAL CELL CARCINOMA, NODULAR TYPE   - The  tumor involves the deep tissue edge.  The peripheral tissue edge is uninvolved by tumor.    Derm Hx   Phx  BCC - left neck - Dr. K [facility]  Denies Fhx MM      Review of Systems   Constitutional:  Negative for fever, chills and fatigue.   Respiratory:  Negative for cough and shortness of breath.    Gastrointestinal:  Negative for nausea, vomiting and diarrhea.   Skin:  Positive for activity-related sunscreen use. Negative for wears hat.   Hematologic/Lymphatic: Does not bruise/bleed easily.       Objective:   Physical Exam   Constitutional: He appears well-developed and well-nourished. No distress.   Neurological: He is alert and oriented to person, place, and time. He is not disoriented.   Psychiatric: He has a normal mood and affect.   Skin:   Areas Examined (abnormalities noted in diagram):   Scalp / Hair Palpated and Inspected  Head / Face Inspection Performed  Neck Inspection Performed  Chest / Axilla Inspection Performed  Abdomen Inspection Performed  Genitals / Buttocks / Groin Inspection Performed  Back Inspection Performed  RUE Inspected  LUE Inspection Performed  RLE Inspected  LLE Inspection Performed  Nails and Digits Inspection Performed                     Diagram Legend     Erythematous scaling macule/papule c/w actinic keratosis       Vascular papule c/w angioma      Pigmented verrucoid papule/plaque c/w seborrheic keratosis      Yellow umbilicated papule c/w sebaceous hyperplasia      Irregularly shaped tan macule c/w lentigo     1-2 mm smooth white papules consistent with Milia      Movable subcutaneous cyst with punctum c/w epidermal  inclusion cyst      Subcutaneous movable cyst c/w pilar cyst      Firm pink to brown papule c/w dermatofibroma      Pedunculated fleshy papule(s) c/w skin tag(s)      Evenly pigmented macule c/w junctional nevus     Mildly variegated pigmented, slightly irregular-bordered macule c/w mildly atypical nevus      Flesh colored to evenly pigmented papule c/w intradermal nevus       Pink pearly papule/plaque c/w basal cell carcinoma      Erythematous hyperkeratotic cursted plaque c/w SCC      Surgical scar with no sign of skin cancer recurrence      Open and closed comedones      Inflammatory papules and pustules      Verrucoid papule consistent consistent with wart     Erythematous eczematous patches and plaques     Dystrophic onycholytic nail with subungual debris c/w onychomycosis     Umbilicated papule    Erythematous-base heme-crusted tan verrucoid plaque consistent with inflamed seborrheic keratosis     Erythematous Silvery Scaling Plaque c/w Psoriasis     See annotation      Assessment / Plan:      Pathology Orders:       Normal Orders This Visit    Specimen to Pathology, Dermatology     Questions:    Procedure Type: Dermatology and skin neoplasms    Number of Specimens: 1    ------------------------: -------------------------    Spec 1 Procedure: Biopsy    Spec 1 Clinical Impression: r/o bcc    Spec 1 Source: right upper back    Release to patient:           Neoplasm of uncertain behavior of skin  -     Specimen to Pathology, Dermatology  Shave biopsy procedure note:    Shave biopsy performed after verbal consent including risk of infection, scar, recurrence, need for additional treatment of site. Area prepped with alcohol, anesthetized with approximately 1.0cc of 1% lidocaine with epinephrine. Lesional tissue shaved with razor blade. Hemostasis achieved with application of aluminum chloride followed by hyfrecation. No complications. Dressing applied. Wound care explained.    History of nonmelanoma skin  cancer  Scar  Area of previous NMSC examined. Site well healed with no signs of recurrence.  Total body skin examination performed today including at least 12 points as noted in physical examination. Suspicious lesions noted.  Patient instructed in importance in daily broad spectrum sun protection of at least spf 30. Mineral sunscreen ingredients preferred (Zinc +/- Titanium) and can be found OTC.   Patient encouraged to wear hat for all outdoor exposure.   Also discussed sun avoidance and use of protective clothing.    Sebaceous hyperplasia  This is a common condition representing benign enlargement of the sebaceous lobule. It typically occurs in adulthood. Reassurance given to patient.     Kendall angioma  This is a benign vascular lesion. Reassurance given. No treatment required.     Multiple benign nevi  Careful dermoscopy evaluation of nevi performed   Monitor for new mole or moles that are becoming bigger, darker, irritated, or developing irregular borders.     Seborrheic keratosis  These are benign inherited growths without a malignant potential. Reassurance given to patient. No treatment is necessary.     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

## 2024-04-29 PROBLEM — H35.3221 EXUDATIVE AGE-RELATED MACULAR DEGENERATION OF LEFT EYE WITH ACTIVE CHOROIDAL NEOVASCULARIZATION: Status: ACTIVE | Noted: 2024-04-29

## 2024-04-29 NOTE — PROGRESS NOTES
HPI    3m OCT/DFE/Eylea OS    Pt states va is stable since last visit. No new symptoms or concerns   today.    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 Hypertensive retinopathy, bilateral   4. Pseudophakia of both ey es   5. Central Atrophy OD       Last edited by Mago Nuñez on 4/30/2024 10:34 AM.            A/P    ICD-10-CM ICD-9-CM   1. Exudative age-related macular degeneration of left eye with active choroidal neovascularization  H35.3221 362.52     362.16   2. Exudative age-related macular degeneration of right eye with inactive choroidal neovascularization  H35.3212 362.52     362.16   3. Hypertensive retinopathy, bilateral  H35.033 362.11   4. Pseudophakia of both eyes  Z96.1 V43.1   5. Vitreous degeneration of both eyes  H43.813 379.21         1. Exudative age-related macular degeneration of left eye with active choroidal neovascularization  2. 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 Mendez, transitioned care to [facility]    OD: 20/80 ecc (stable), central atrophy OD stable   Plan: Observe, inactive CNVM , no injection, given atrophy can consider Syfovre in future     OS: s/p SLY in past  S/p KENISHA X3 3/16/23   S/p SLY x3  1/30/24  VA 20/25 (stable), SRF slight incr after last Eylea 3 mo ago   Plan:  last injection was Jan 2024 (prior to that was in April 2023!) now with slight incr recurrent SRF, recommend Eylea OS for control of CNVM in better seeing eye , pt will be out of country till October     Based on todays exam, diagnostic studies, and review of records, the determination was made for treatment today.  Schedule Eylea Injection Left Eye today Patient chooses to proceed with injection R/B/A discussed  include infection retinal detachment and stroke    Patient identified.  Timeout performed.    Risks, benefits, and alternatives to treatment were discussed in detail with the patient, including bleeding/infection (endophthalmitis)/etc.  The patient voiced understanding and wished to proceed with the procedure.  See separate consent form.    Injection Procedure Note:  Diagnosis: CNVM Left Eye    Topical Proparacaine drop placed then topical 5% Betadine, then subcojunctival lidocaine 2% injection  Sterile gloves used, and sterile lid speculum placed.  5% Betadine placed at injection site again prior to injection.  Eylea 2mg in 0.05cc Injected inferotemporally 3.5-4mm posterior to the limbus.  Complications: None  Va at least CF at 5 feet post injection.  Retina, ONH, IOP normal after injection.    Followup as below.  Patient should return immediately PRN.  Retinal Detachment and Endophthalmitis precautions given     3. Hypertensive retinopathy, bilateral  PCP Aramis Barakat MD - notes reviewed  01/12/2024  5.3  A1C   Mild HTN changes  Plan: Observation, stable changes   Recommend good blood pressure control, tight blood glucose control, and good cholesterol control     4. Pseudophakia of both eyes  Good lens position OU  Plan: Observation, update Mrx   Monocular precautions     5. Vitreous degeneration of both eyes  No RT/RD   Plan: Observation            RTC 6 mo DFE/OCTm OU, poss Eylea OS  ([place])     I saw and examined the patient and reviewed in detail the findings documented. The final examination findings, image interpretations, and plan as documented in the record represent my personal judgment and conclusions.    Beto Norton MD  Vitreoretinal Surgery   [facility]

## 2024-04-30 ENCOUNTER — OFFICE VISIT (OUTPATIENT)
Dept: OPHTHALMOLOGY | Facility: CLINIC | Age: 79
End: 2024-04-30
Payer: MEDICARE

## 2024-04-30 DIAGNOSIS — H43.813 VITREOUS DEGENERATION OF BOTH EYES: ICD-10-CM

## 2024-04-30 DIAGNOSIS — H35.033 HYPERTENSIVE RETINOPATHY, BILATERAL: ICD-10-CM

## 2024-04-30 DIAGNOSIS — Z96.1 PSEUDOPHAKIA OF BOTH EYES: ICD-10-CM

## 2024-04-30 DIAGNOSIS — H35.3212 EXUDATIVE AGE-RELATED MACULAR DEGENERATION OF RIGHT EYE WITH INACTIVE CHOROIDAL NEOVASCULARIZATION: ICD-10-CM

## 2024-04-30 DIAGNOSIS — H35.3221 EXUDATIVE AGE-RELATED MACULAR DEGENERATION OF LEFT EYE WITH ACTIVE CHOROIDAL NEOVASCULARIZATION: Primary | ICD-10-CM

## 2024-04-30 PROCEDURE — 3288F FALL RISK ASSESSMENT DOCD: CPT | Mod: CPTII,S$GLB,, | Performed by: OPHTHALMOLOGY

## 2024-04-30 PROCEDURE — 99214 OFFICE O/P EST MOD 30 MIN: CPT | Mod: 25,S$GLB,, | Performed by: OPHTHALMOLOGY

## 2024-04-30 PROCEDURE — 1159F MED LIST DOCD IN RCRD: CPT | Mod: CPTII,S$GLB,, | Performed by: OPHTHALMOLOGY

## 2024-04-30 PROCEDURE — 99999 PR PBB SHADOW E&M-EST. PATIENT-LVL III: CPT | Mod: PBBFAC,,, | Performed by: OPHTHALMOLOGY

## 2024-04-30 PROCEDURE — 1160F RVW MEDS BY RX/DR IN RCRD: CPT | Mod: CPTII,S$GLB,, | Performed by: OPHTHALMOLOGY

## 2024-04-30 PROCEDURE — 1101F PT FALLS ASSESS-DOCD LE1/YR: CPT | Mod: CPTII,S$GLB,, | Performed by: OPHTHALMOLOGY

## 2024-04-30 PROCEDURE — 92134 CPTRZ OPH DX IMG PST SGM RTA: CPT | Mod: S$GLB,,, | Performed by: OPHTHALMOLOGY

## 2024-04-30 PROCEDURE — 67028 INJECTION EYE DRUG: CPT | Mod: LT,S$GLB,, | Performed by: OPHTHALMOLOGY

## 2024-04-30 PROCEDURE — 1126F AMNT PAIN NOTED NONE PRSNT: CPT | Mod: CPTII,S$GLB,, | Performed by: OPHTHALMOLOGY

## 2024-05-01 ENCOUNTER — TELEPHONE (OUTPATIENT)
Dept: FAMILY MEDICINE | Facility: CLINIC | Age: 79
End: 2024-05-01
Payer: MEDICARE

## 2024-05-01 ENCOUNTER — TELEPHONE (OUTPATIENT)
Dept: DERMATOLOGY | Facility: CLINIC | Age: 79
End: 2024-05-01
Payer: MEDICARE

## 2024-05-01 PROBLEM — I77.810 THORACIC AORTIC ECTASIA: Status: ACTIVE | Noted: 2024-05-01

## 2024-05-01 LAB
FINAL PATHOLOGIC DIAGNOSIS: NORMAL
GROSS: NORMAL
Lab: NORMAL
MICROSCOPIC EXAM: NORMAL

## 2024-05-01 NOTE — TELEPHONE ENCOUNTER
Returned call to patients wife, results reviewed with verbal understanding. Patients wife wanted to know if it would be okay to wait 6 months to have the area taken care of since they are leaving the country next week. Advised patients wife that I would check with Dr. Tapia and give her a call back.     Dr. Tapia did state that it would be okay to postpone the ED&C until they returned but the skin cancer could return in the meantime.     Returned call to patients wife and gave Dr. Tapia's recommendations - patients wife stated they would take the Monday appointment and have it taken care of before they leave. Patient scheduled.     ----- Message from Mary Cancino sent at 5/1/2024  2:18 PM CDT -----  Type:  Patient Returning Call    Who Called:  pt  Who Left Message for Patient: Pratibha  Does the patient know what this is regarding?:  yes  Best Call Back Number:  [phone]  Additional Information:  Pt is returning a call in regards to a missed call to discuss results. Needs to speak to the nurse. Please call back and advise. Thanks!

## 2024-05-01 NOTE — TELEPHONE ENCOUNTER
Attempted to contact patient to review results, no answer, lvm for a return call.     ----- Message from Abigail Tapia MD sent at 5/1/2024 11:42 AM CDT -----  Final Pathologic Diagnosis 1. Skin, right upper back,  shave biopsy:   - BASAL CELL CARCINOMA,  NODULAR TYPE   - The  deep and peripheral tissue edges are uninvolved by tumor.    This lesion is skin cancer. You will be contacted regarding treatment.    Comment: Interp By Mallory Viera MD., Signed on 05/01/2024 at 09:28    ##Please notify patient of results and need for further treatment. Please schedule patient for ED&C in the coming weeks. I know he is leaving to go out of town. Can he come Monday? Have 2 spots available

## 2024-05-06 ENCOUNTER — OFFICE VISIT (OUTPATIENT)
Dept: DERMATOLOGY | Facility: CLINIC | Age: 79
End: 2024-05-06
Payer: MEDICARE

## 2024-05-06 DIAGNOSIS — C44.519 BASAL CELL CARCINOMA (BCC) OF BACK: Primary | ICD-10-CM

## 2024-05-06 PROCEDURE — 17262 DSTRJ MAL LES T/A/L 1.1-2.0: CPT | Mod: S$GLB,,, | Performed by: STUDENT IN AN ORGANIZED HEALTH CARE EDUCATION/TRAINING PROGRAM

## 2024-05-06 PROCEDURE — 99499 UNLISTED E&M SERVICE: CPT | Mod: S$GLB,,, | Performed by: STUDENT IN AN ORGANIZED HEALTH CARE EDUCATION/TRAINING PROGRAM

## 2024-05-06 NOTE — PATIENT INSTRUCTIONS

## 2024-05-06 NOTE — PROGRESS NOTES
Subjective:      Patient ID:  Jean Claude Menard is a 79 y.o. male who presents for   Chief Complaint   Patient presents with    Skin Cancer     ED&C     LOV 4/25/24    Patient here today for ED&C of BCC on right upper back.    Final Pathologic Diagnosis 1. Skin, right upper back,  shave biopsy:   - BASAL CELL CARCINOMA,  NODULAR TYPE   - The  deep and peripheral tissue edges are uninvolved by tumor.    Derm Hx   BCC - left neck - Dr. K [facility]  Denies Fhx MM          Review of Systems   Constitutional:  Negative for fever, chills and fatigue.   Respiratory:  Negative for cough and shortness of breath.    Gastrointestinal:  Negative for nausea, vomiting and diarrhea.   Skin:  Positive for activity-related sunscreen use. Negative for wears hat.   Hematologic/Lymphatic: Does not bruise/bleed easily.       Objective:   Physical Exam   Constitutional: He appears well-developed and well-nourished.   Neurological: He is alert and oriented to person, place, and time.   Psychiatric: He has a normal mood and affect.   Skin:   Areas Examined (abnormalities noted in diagram):   Back Inspection Performed            Diagram Legend     Erythematous scaling macule/papule c/w actinic keratosis       Vascular papule c/w angioma      Pigmented verrucoid papule/plaque c/w seborrheic keratosis      Yellow umbilicated papule c/w sebaceous hyperplasia      Irregularly shaped tan macule c/w lentigo     1-2 mm smooth white papules consistent with Milia      Movable subcutaneous cyst with punctum c/w epidermal inclusion cyst      Subcutaneous movable cyst c/w pilar cyst      Firm pink to brown papule c/w dermatofibroma      Pedunculated fleshy papule(s) c/w skin tag(s)      Evenly pigmented macule c/w junctional nevus     Mildly variegated pigmented, slightly irregular-bordered macule c/w mildly atypical nevus      Flesh colored to evenly pigmented papule c/w intradermal nevus       Pink pearly papule/plaque c/w basal cell carcinoma       Erythematous hyperkeratotic cursted plaque c/w SCC      Surgical scar with no sign of skin cancer recurrence      Open and closed comedones      Inflammatory papules and pustules      Verrucoid papule consistent consistent with wart     Erythematous eczematous patches and plaques     Dystrophic onycholytic nail with subungual debris c/w onychomycosis     Umbilicated papule    Erythematous-base heme-crusted tan verrucoid plaque consistent with inflamed seborrheic keratosis     Erythematous Silvery Scaling Plaque c/w Psoriasis     See annotation      Assessment / Plan:        Basal cell carcinoma (BCC) of back  Here for electrodesiccation and curettage of BCC on the right upper back:    Electrodessication and Curettage Procedure note:    Verbal consent obtained. Lesional tissue marked and prepped with alcohol. Lesion anesthetized with 1% lidocaine with epinephrine. Curettage and Desiccation x 3 cycles to base. Aluminum chloride for hemostasis. Lesion size after primary curettage: 1.1 cm    Area bandaged and wound care explained.             No follow-ups on file.

## 2024-10-16 ENCOUNTER — OFFICE VISIT (OUTPATIENT)
Dept: OPHTHALMOLOGY | Facility: CLINIC | Age: 79
End: 2024-10-16
Payer: MEDICARE

## 2024-10-16 DIAGNOSIS — Z96.1 PSEUDOPHAKIA OF BOTH EYES: ICD-10-CM

## 2024-10-16 DIAGNOSIS — H35.3221 EXUDATIVE AGE-RELATED MACULAR DEGENERATION OF LEFT EYE WITH ACTIVE CHOROIDAL NEOVASCULARIZATION: Primary | ICD-10-CM

## 2024-10-16 DIAGNOSIS — H43.813 VITREOUS DEGENERATION OF BOTH EYES: ICD-10-CM

## 2024-10-16 DIAGNOSIS — H35.3212 EXUDATIVE AGE-RELATED MACULAR DEGENERATION OF RIGHT EYE WITH INACTIVE CHOROIDAL NEOVASCULARIZATION: ICD-10-CM

## 2024-10-16 DIAGNOSIS — H35.033 HYPERTENSIVE RETINOPATHY, BILATERAL: ICD-10-CM

## 2024-10-16 PROCEDURE — 1160F RVW MEDS BY RX/DR IN RCRD: CPT | Mod: CPTII,S$GLB,, | Performed by: OPHTHALMOLOGY

## 2024-10-16 PROCEDURE — 99999 PR PBB SHADOW E&M-EST. PATIENT-LVL III: CPT | Mod: PBBFAC,,, | Performed by: OPHTHALMOLOGY

## 2024-10-16 PROCEDURE — 92134 CPTRZ OPH DX IMG PST SGM RTA: CPT | Mod: S$GLB,,, | Performed by: OPHTHALMOLOGY

## 2024-10-16 PROCEDURE — 1126F AMNT PAIN NOTED NONE PRSNT: CPT | Mod: CPTII,S$GLB,, | Performed by: OPHTHALMOLOGY

## 2024-10-16 PROCEDURE — G2211 COMPLEX E/M VISIT ADD ON: HCPCS | Mod: S$GLB,,, | Performed by: OPHTHALMOLOGY

## 2024-10-16 PROCEDURE — 99213 OFFICE O/P EST LOW 20 MIN: CPT | Mod: S$GLB,,, | Performed by: OPHTHALMOLOGY

## 2024-10-16 PROCEDURE — 1159F MED LIST DOCD IN RCRD: CPT | Mod: CPTII,S$GLB,, | Performed by: OPHTHALMOLOGY

## 2024-10-16 PROCEDURE — 3288F FALL RISK ASSESSMENT DOCD: CPT | Mod: CPTII,S$GLB,, | Performed by: OPHTHALMOLOGY

## 2024-10-16 PROCEDURE — 1101F PT FALLS ASSESS-DOCD LE1/YR: CPT | Mod: CPTII,S$GLB,, | Performed by: OPHTHALMOLOGY

## 2024-10-16 PROCEDURE — 92202 OPSCPY EXTND ON/MAC DRAW: CPT | Mod: 59,S$GLB,, | Performed by: OPHTHALMOLOGY

## 2024-10-16 NOTE — PROGRESS NOTES
HPI    DLE: 4/30/2024 pt present for 6mo DFE/OCTm with possible Eylea     Pt states having some blurry VA after being on computer for a few hours    Denies pain/FOL/floaters    Last edited by Roxana Ojeda on 10/16/2024 10:48 AM.            A/P    ICD-10-CM ICD-9-CM   1. Exudative age-related macular degeneration of left eye with active choroidal neovascularization  H35.3221 362.52     362.16   2. Exudative age-related macular degeneration of right eye with inactive choroidal neovascularization  H35.3212 362.52     362.16   3. Hypertensive retinopathy, bilateral  H35.033 362.11   4. Pseudophakia of both eyes  Z96.1 V43.1   5. Vitreous degeneration of both eyes  H43.813 379.21       1. Exudative age-related macular degeneration of left eye with active choroidal neovascularization  2. 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 Mendez, transitioned care to [facility]    OD: 20/80 ecc (stable), central atrophy OD     Plan: Observe, inactive CNVM , no injection     OS: s/p SLY in past  S/p KENISHA X3 3/16/23   S/p SLY x4 4/30/24  VA 20/20 (was 20/25), SRF betterafter last Eylea back in April (prior to that was Jan and prior to that was April 2023)  Plan: monitor for now, recheck in 3 mo, can consider Eylea in Jan if having recurrent SRF    Visit today is associated with current or anticipated ongoing medical care related to this patients single serious condition/complex condition (macular degeneration)     3. Hypertensive retinopathy, bilateral  PCP Aramis Barakat MD - notes reviewed  01/12/2024  5.3  A1C   Mild HTN changes  Plan: Observation, stable     Recommend good blood pressure control, tight blood glucose control, and good cholesterol control     4. Pseudophakia of both eyes  Good lens position OU  Plan: Observation, update Mrx   Monocular precautions     5. Vitreous degeneration of both eyes  No RT/RD, few floaters   Plan: Observation             RTC 3 mo DFE/OCTm OU, poss Eylea OS  ([place])     I saw and examined the patient and reviewed in detail the findings documented. The final examination findings, image interpretations, and plan as documented in the record represent my personal judgment and conclusions.    Beto Norton MD  Vitreoretinal Surgery   [facility]

## 2024-10-21 ENCOUNTER — OFFICE VISIT (OUTPATIENT)
Dept: DERMATOLOGY | Facility: CLINIC | Age: 79
End: 2024-10-21
Payer: MEDICARE

## 2024-10-21 DIAGNOSIS — D18.01 CHERRY ANGIOMA: ICD-10-CM

## 2024-10-21 DIAGNOSIS — L73.8 SEBACEOUS HYPERPLASIA: ICD-10-CM

## 2024-10-21 DIAGNOSIS — Z85.828 HISTORY OF NONMELANOMA SKIN CANCER: ICD-10-CM

## 2024-10-21 DIAGNOSIS — D22.9 MULTIPLE BENIGN NEVI: ICD-10-CM

## 2024-10-21 DIAGNOSIS — L90.5 SCAR: ICD-10-CM

## 2024-10-21 DIAGNOSIS — T14.8XXA EXCORIATION: ICD-10-CM

## 2024-10-21 DIAGNOSIS — L91.0 HYPERTROPHIC SCAR: ICD-10-CM

## 2024-10-21 DIAGNOSIS — D48.5 NEOPLASM OF UNCERTAIN BEHAVIOR OF SKIN: Primary | ICD-10-CM

## 2024-10-21 DIAGNOSIS — L72.9 CYST OF SKIN: ICD-10-CM

## 2024-10-21 DIAGNOSIS — L82.1 SEBORRHEIC KERATOSIS: ICD-10-CM

## 2024-10-21 PROCEDURE — 1159F MED LIST DOCD IN RCRD: CPT | Mod: CPTII,S$GLB,, | Performed by: STUDENT IN AN ORGANIZED HEALTH CARE EDUCATION/TRAINING PROGRAM

## 2024-10-21 PROCEDURE — 1101F PT FALLS ASSESS-DOCD LE1/YR: CPT | Mod: CPTII,S$GLB,, | Performed by: STUDENT IN AN ORGANIZED HEALTH CARE EDUCATION/TRAINING PROGRAM

## 2024-10-21 PROCEDURE — 1126F AMNT PAIN NOTED NONE PRSNT: CPT | Mod: CPTII,S$GLB,, | Performed by: STUDENT IN AN ORGANIZED HEALTH CARE EDUCATION/TRAINING PROGRAM

## 2024-10-21 PROCEDURE — 1160F RVW MEDS BY RX/DR IN RCRD: CPT | Mod: CPTII,S$GLB,, | Performed by: STUDENT IN AN ORGANIZED HEALTH CARE EDUCATION/TRAINING PROGRAM

## 2024-10-21 PROCEDURE — 99213 OFFICE O/P EST LOW 20 MIN: CPT | Mod: 25,S$GLB,, | Performed by: STUDENT IN AN ORGANIZED HEALTH CARE EDUCATION/TRAINING PROGRAM

## 2024-10-21 PROCEDURE — 11900 INJECT SKIN LESIONS </W 7: CPT | Mod: S$GLB,,, | Performed by: STUDENT IN AN ORGANIZED HEALTH CARE EDUCATION/TRAINING PROGRAM

## 2024-10-21 PROCEDURE — 3288F FALL RISK ASSESSMENT DOCD: CPT | Mod: CPTII,S$GLB,, | Performed by: STUDENT IN AN ORGANIZED HEALTH CARE EDUCATION/TRAINING PROGRAM

## 2024-10-21 PROCEDURE — 11102 TANGNTL BX SKIN SINGLE LES: CPT | Mod: S$GLB,,, | Performed by: STUDENT IN AN ORGANIZED HEALTH CARE EDUCATION/TRAINING PROGRAM

## 2024-10-21 NOTE — PATIENT INSTRUCTIONS
Shave Biopsy Wound Care    Your doctor has performed a shave biopsy today.  A band aid and vaseline ointment has been placed over the site.  This should remain in place for 24 hours.  It is recommended that you keep the area dry for the first 24 hours.  After 24 hours, you may remove the band aid and wash the area with warm soap and water and apply Vaseline jelly.  Many patients prefer to use Neosporin or Bacitracin ointment.  This is acceptable; however, know that you can develop an allergy to this medication even if you have used it safely for years.  It is important to keep the area moist.  Letting it dry out and get air slows healing time, and will worsen the scar.  Band aid is optional after first 24 hours.      If you notice increasing redness, tenderness, pain, or yellow drainage at the biopsy site, please notify your doctor.  These are signs of an infection.    If your biopsy site is bleeding, apply firm pressure for 15 minutes straight.  Repeat for another 15 minutes, if it is still bleeding.   If the surgical site continues to bleed, then please contact your doctor.      [facility] [place] [address]/ [phone] FAX/ [email or web address]

## 2024-10-21 NOTE — PROGRESS NOTES
Subjective:      Patient ID:  Jean Claude Menard is a 79 y.o. male who presents for   Chief Complaint   Patient presents with    Skin Cancer     Hx of bcc     LOV 5/6/24    Patient here today for skin check UBSE  Complains of itching on back. Unsure if anything is there. Thinks it might be where ED&C of a bcc was done at last visit.     Path from last visit: 4/25/24  Final Pathologic Diagnosis       1. Skin, right upper back,  shave biopsy: --> ED&C 5/6/24   - BASAL CELL CARCINOMA,  NODULAR TYPE   - The  deep and peripheral tissue edges are uninvolved by tumor.     Derm Hx   BCC, left neck, Dr. K [facility]  Denies Fhx MM          Review of Systems   Constitutional:  Negative for fever, chills and fatigue.   Respiratory:  Negative for cough and shortness of breath.    Gastrointestinal:  Negative for nausea, vomiting and diarrhea.   Skin:  Positive for activity-related sunscreen use. Negative for daily sunscreen use and wears hat.   Hematologic/Lymphatic: Does not bruise/bleed easily.       Objective:   Physical Exam   Constitutional: He appears well-developed and well-nourished. No distress.   Neurological: He is alert and oriented to person, place, and time. He is not disoriented.   Psychiatric: He has a normal mood and affect.   Skin:   Areas Examined (abnormalities noted in diagram):   Scalp / Hair Palpated and Inspected  Head / Face Inspection Performed  Neck Inspection Performed  Chest / Axilla Inspection Performed  Abdomen Inspection Performed  Back Inspection Performed  RUE Inspected  LUE Inspection Performed  Nails and Digits Inspection Performed                     Diagram Legend     Erythematous scaling macule/papule c/w actinic keratosis       Vascular papule c/w angioma      Pigmented verrucoid papule/plaque c/w seborrheic keratosis      Yellow umbilicated papule c/w sebaceous hyperplasia      Irregularly shaped tan macule c/w lentigo     1-2 mm smooth white papules consistent with Milia      Movable  subcutaneous cyst with punctum c/w epidermal inclusion cyst      Subcutaneous movable cyst c/w pilar cyst      Firm pink to brown papule c/w dermatofibroma      Pedunculated fleshy papule(s) c/w skin tag(s)      Evenly pigmented macule c/w junctional nevus     Mildly variegated pigmented, slightly irregular-bordered macule c/w mildly atypical nevus      Flesh colored to evenly pigmented papule c/w intradermal nevus       Pink pearly papule/plaque c/w basal cell carcinoma      Erythematous hyperkeratotic cursted plaque c/w SCC      Surgical scar with no sign of skin cancer recurrence      Open and closed comedones      Inflammatory papules and pustules      Verrucoid papule consistent consistent with wart     Erythematous eczematous patches and plaques     Dystrophic onycholytic nail with subungual debris c/w onychomycosis     Umbilicated papule    Erythematous-base heme-crusted tan verrucoid plaque consistent with inflamed seborrheic keratosis     Erythematous Silvery Scaling Plaque c/w Psoriasis     See annotation      Assessment / Plan:      Pathology Orders:       Normal Orders This Visit    Specimen to Pathology, Dermatology     Comments:    Number of Specimens:->1  ------------------------->-------------------------  Spec 1 Procedure:->Biopsy  Spec 1 Clinical Impression:->r/o bcc  Spec 1 Source:->left nasal sidewall    Questions:    Procedure Type: Dermatology and skin neoplasms    Number of Specimens: 1    ------------------------: -------------------------    Spec 1 Procedure: Biopsy    Spec 1 Clinical Impression: r/o bcc    Spec 1 Source: left nasal sidewall    Release to patient:           Neoplasm of uncertain behavior of skin  -     Specimen to Pathology, Dermatology  Shave biopsy procedure note:    Shave biopsy performed after verbal consent including risk of infection, scar, recurrence, need for additional treatment of site. Area prepped with alcohol, anesthetized with approximately 1.0cc of 1%  lidocaine with epinephrine. Lesional tissue shaved with razor blade. Hemostasis achieved with application of aluminum chloride followed by hyfrecation. No complications. Dressing applied. Wound care explained.    History of nonmelanoma skin cancer  Scar  Area(s) of previous NMSC evaluated with no signs of recurrence.  Upper body skin examination performed today including at least 9 points as noted in physical examination. Suspicious lesions noted.  Patient instructed in importance in daily broad spectrum sun protection of at least spf 30. Mineral sunscreen ingredients preferred (Zinc +/- Titanium) and can be found OTC.   Patient encouraged to wear hat for all outdoor exposure.   Also discussed sun avoidance and use of protective clothing.    Hypertrophic scar  Intralesional Kenalog 10mg/cc (0.1 cc total) injected into 1 lesions on the back today after obtaining verbal consent including risk of surrounding hypopigmentation. Patient tolerated procedure well.    Units: 1  NDC for Kenalog 10mg/cc:  [id number]    Sebaceous hyperplasia  This is a common condition representing benign enlargement of the sebaceous lobule. It typically occurs in adulthood. Reassurance given to patient.     Seborrheic keratosis  These are benign inherited growths without a malignant potential. Reassurance given to patient. No treatment is necessary.     Cherry angioma  This is a benign vascular lesion. Reassurance given. No treatment required.     Multiple benign nevi  Careful dermoscopy evaluation of nevi performed   Monitor for new mole or moles that are becoming bigger, darker, irritated, or developing irregular borders.     Cyst of skin  Reassurance given to patient. No treatment is necessary.   Discussed treatment options - excision vs observation. Cysts may recur with excision. Pt will defer treatment at this time.     Excoriation  Glabella  Woke up this morning with red spot on his glabella   Appears to be excoriation  If not healed  in 2-3 weeks instructed to call and let me know         6 months  No follow-ups on file.

## 2024-10-22 ENCOUNTER — PATIENT MESSAGE (OUTPATIENT)
Dept: DERMATOLOGY | Facility: CLINIC | Age: 79
End: 2024-10-22
Payer: MEDICARE

## 2024-10-30 ENCOUNTER — TELEPHONE (OUTPATIENT)
Dept: DERMATOLOGY | Facility: CLINIC | Age: 79
End: 2024-10-30
Payer: MEDICARE

## 2024-10-30 ENCOUNTER — PATIENT MESSAGE (OUTPATIENT)
Dept: DERMATOLOGY | Facility: CLINIC | Age: 79
End: 2024-10-30
Payer: MEDICARE

## 2024-10-30 DIAGNOSIS — C44.311 BASAL CELL CARCINOMA (BCC) OF LEFT SIDE OF NOSE: Primary | ICD-10-CM

## 2024-11-06 ENCOUNTER — PROCEDURE VISIT (OUTPATIENT)
Dept: DERMATOLOGY | Facility: CLINIC | Age: 79
End: 2024-11-06
Payer: MEDICARE

## 2024-11-06 VITALS
HEIGHT: 69 IN | DIASTOLIC BLOOD PRESSURE: 71 MMHG | HEART RATE: 57 BPM | WEIGHT: 117.94 LBS | BODY MASS INDEX: 17.47 KG/M2 | SYSTOLIC BLOOD PRESSURE: 129 MMHG

## 2024-11-06 DIAGNOSIS — C44.311 BASAL CELL CARCINOMA (BCC) OF LEFT SIDE OF NOSE: ICD-10-CM

## 2024-11-06 PROCEDURE — 17312 MOHS ADDL STAGE: CPT | Mod: S$GLB,,, | Performed by: DERMATOLOGY

## 2024-11-06 PROCEDURE — 17311 MOHS 1 STAGE H/N/HF/G: CPT | Mod: 51,S$GLB,, | Performed by: DERMATOLOGY

## 2024-11-06 PROCEDURE — 14060 TIS TRNFR E/N/E/L 10 SQ CM/<: CPT | Mod: S$GLB,,, | Performed by: DERMATOLOGY

## 2024-11-06 RX ORDER — DOXYCYCLINE 100 MG/1
100 CAPSULE ORAL 2 TIMES DAILY
Qty: 14 CAPSULE | Refills: 0 | Status: SHIPPED | OUTPATIENT
Start: 2024-11-06 | End: 2024-11-13

## 2024-11-06 NOTE — PROGRESS NOTES
MOHS MICROGRAPHIC SURGERY OPERATIVE NOTE  Name:  Jean Claude Menard  Date: 2024  Patient : 1945  Attending Surgeon: Darrin Rodriguez MD  Assistants: Mariam Martel - Surgical Technician  Anesthetic Agent: 1% lidocaine with 1:100,000 epinephrine  Clinical Diagnosis: basal cell carcinoma - infiltrative  Operation: Mohs Micrographic Surgery  Location: left nasal sidewall  Indications: Location in mask areas of face including central face, nose, eyelids, eyebrows, lips, chin, preauricular, temple, and ear.  Surgical Preparation: povidone-iodine  Pre-op anbitioics: no     Description of Operation:  The nature and purpose of the procedure, associated risks and alternative treatments were explained to the patient in detail. All patient questions were answered completely. An informed operative consent and photography permit were obtained. The tumor location was then identified and marked with agreement by the patient of the correct location. The patient was positioned, prepped, and draped in the usual sterile manner. Local anesthesia was obtained with 1 cc(s) of 1% lidocaine with 1:100,000 epinephrine. The lesion pre-operatively measures 0.5 by 0.5 cm.     1ST STAGE:  A 2+ mm rim of normal appearing skin was marked circumferentially around the lesion after scraping with a curette to define the margin. The area thus outlined was excised at a 45 degree angle. Hemostasis was obtained with electrodesiccation. The specimen was oriented, mapped, and subdivided into at least two sections. Sections were then chromacoded and submitted for horizontal frozen sections. The patient tolerated the procedure well and there were no complications. Upon microscopic examination of the processed horizontal frozen sections of this stage:  Tumor was present at the margin of the specimen; these areas of residual tumor were marked on the reference map with red pencil, pinpointing the location in which further tissue excision was  necessary.    Tumor type noted on stage 1: Nodular basal cell carcinoma: Nodular tumor in dermis composed of basaloid cells exhibiting peripheral palisading and retraction artifact.     SUBSEQUENT STAGES: The patient returned to the operating room for additional stages of tumor removal, and prepped in the manner described above. Surgery was directed to the areas having residual tumor, with thin layers of tissue being excised from these regions. A new reference map was prepared during the surgery to maintain precise orientation as described above. Hemostasis was achieved and the excised tissue was processed for microscopic analysis. These sections were then examined by the Mohs surgeon, and areas of persistent tumor were indicated on the reference map. This process was repeated until the frozen horizontal sections of STAGE 3 revealed no further tumor cells and tumor eradication was considered to be complete.  Tumor type noted on subsequent stages: Infiltrative basal cell carcinoma: Basaloid tumor in dermis characterized by thin elongated strands of basaloid cells infiltrating between dermal collagen bundles.       SUMMARY:  The tumor was extirpated in 3 Mohs Stages resulting in a final defect measuring 1.2 by 1.2 cm².   The final defect extended deep to cartilage  The patient tolerated the procedure well and no complications were noted.     DEFECT PHOTO:      Darrin Rodriguez MD  FLAP CLOSURE OF MOHS DEFECT OPERATIVE REPORT  Patient Name: Jean Claude Menard  Patient YOB: 1945  Date of procedure: 11/6/2024  Attending Surgeon: Darrin Rodriguez MD FAAD  Anesthetic Agent: 1% lidocaine with 1:100,000 epinephrine   Assistant: Mariam Martel - Surgical Technician  Clinical Diagnosis: A 1.2 x 1.2 cm² defect after Mohs Micrographic Surgery  Location: left nasal sidewall  Operation:  East-West advancement flap   Surgical Preparation: povidone-iodine    Description of Operation:  The nature and purpose  of the procedure, associated risks and alternative treatments were explained to the patient in detail. All patient questions were answered completely. In order to minimize tension on the closure and avoid compromising the anatomic contour of the anatomic region and maximize functional capacity, the above noted adjacent tissue transfer was performed.    An informed operative consent and photography permit were obtained. The patient was positioned, prepped, and draped in the usual sterile manner. Local anesthesia was obtained with 3 cc(s) of 1% lidocaine with 1:100,000 epinephrine The defect edges were debeveled with a #15 blade. Using gentian violet, the flap was designed adjacent to the defect including all anticipated dog ears. The area thus outlined was incised deep to SMAS with the scalpel. This resulted in a final undermined and elevated flap defect measuring 3.7 x 1.8 cm².   The flap and skin margins were then undermined 50 to 75% of the defects diameter in all directions utilizing supercut iris scissors. Hemostasis was achieved with electrodessication. The secondary defect was closed first utilizing 5-0 Monocryl interrupted buried subcutaneous vertical mattress sutures.     The adjacent tissue flap was then mobilized into place and anchored with additional 5-0 Monocryl interrupted buried subcutaneous vertical mattress sutures. The flap and recipient sites were sculpted in the adipose and dermal planes for a good fit. The skin edges were then reapposed with 6-0 Prolene. Redundant tissue was noted at the inferior and superior aspect of the adjacent tissue transfer. Burows triangles were removed utilizing a #15 blade and the epidermal edges reapposed with 6-0 Prolene. This repair resulted in excellent contour with normal symmetry. The patient tolerated the procedure well and there were no complications.   Polysporin, non-adherent gauze and a pressure dressing were applied to wound after gentle cleansing with  saline.    Post op meds: Doxycycline 100 BID x 7 days     Photos:      Darrin Rodriguez MD FAAD

## 2024-11-13 ENCOUNTER — CLINICAL SUPPORT (OUTPATIENT)
Dept: DERMATOLOGY | Facility: CLINIC | Age: 79
End: 2024-11-13
Payer: MEDICARE

## 2024-11-13 DIAGNOSIS — Z48.02 VISIT FOR SUTURE REMOVAL: Primary | ICD-10-CM

## 2024-11-13 NOTE — PROGRESS NOTES
Mohs Surgery Suture Removal Note   Patient Name: Jean Claude Menard  Patient : 1945  Date of Service: 2024    CC: Pt. Presents for removal of sutures on the nasal bridge today  Subjective: patient reports wound healing as expected     Objective: Wound well healed, sutures clean/dry/intact. No evidence of any complications noted.     Visit For Suture Removal:  - Suture removal today  - Steri strips/mastisol were not applied  - Patient counseled on silicone gel/silicone sheeting and their use in the management of post-operative scars  - Handout on silicone gel/silicone gel sheeting was provided  - Patient to follow up with their referring provider in 3 months for further skin evaluation        Swetha Petersen

## 2025-01-20 PROBLEM — N18.31 CHRONIC KIDNEY DISEASE, STAGE 3A: Status: ACTIVE | Noted: 2025-01-20

## 2025-01-24 ENCOUNTER — CLINICAL SUPPORT (OUTPATIENT)
Dept: OPHTHALMOLOGY | Facility: CLINIC | Age: 80
End: 2025-01-24
Payer: MEDICARE

## 2025-01-24 ENCOUNTER — OFFICE VISIT (OUTPATIENT)
Dept: OPHTHALMOLOGY | Facility: CLINIC | Age: 80
End: 2025-01-24
Payer: MEDICARE

## 2025-01-24 DIAGNOSIS — Z96.1 PSEUDOPHAKIA OF BOTH EYES: ICD-10-CM

## 2025-01-24 DIAGNOSIS — H35.3212 EXUDATIVE AGE-RELATED MACULAR DEGENERATION OF RIGHT EYE WITH INACTIVE CHOROIDAL NEOVASCULARIZATION: ICD-10-CM

## 2025-01-24 DIAGNOSIS — H35.033 HYPERTENSIVE RETINOPATHY, BILATERAL: ICD-10-CM

## 2025-01-24 DIAGNOSIS — H43.813 VITREOUS DEGENERATION OF BOTH EYES: ICD-10-CM

## 2025-01-24 DIAGNOSIS — H35.3221 EXUDATIVE AGE-RELATED MACULAR DEGENERATION OF LEFT EYE WITH ACTIVE CHOROIDAL NEOVASCULARIZATION: Primary | ICD-10-CM

## 2025-01-24 PROCEDURE — 99214 OFFICE O/P EST MOD 30 MIN: CPT | Mod: 25,S$GLB,, | Performed by: OPHTHALMOLOGY

## 2025-01-24 PROCEDURE — 1160F RVW MEDS BY RX/DR IN RCRD: CPT | Mod: CPTII,S$GLB,, | Performed by: OPHTHALMOLOGY

## 2025-01-24 PROCEDURE — 99999 PR PBB SHADOW E&M-EST. PATIENT-LVL III: CPT | Mod: PBBFAC,,, | Performed by: OPHTHALMOLOGY

## 2025-01-24 PROCEDURE — 1126F AMNT PAIN NOTED NONE PRSNT: CPT | Mod: CPTII,S$GLB,, | Performed by: OPHTHALMOLOGY

## 2025-01-24 PROCEDURE — 1101F PT FALLS ASSESS-DOCD LE1/YR: CPT | Mod: CPTII,S$GLB,, | Performed by: OPHTHALMOLOGY

## 2025-01-24 PROCEDURE — 3288F FALL RISK ASSESSMENT DOCD: CPT | Mod: CPTII,S$GLB,, | Performed by: OPHTHALMOLOGY

## 2025-01-24 PROCEDURE — 92134 CPTRZ OPH DX IMG PST SGM RTA: CPT | Mod: S$GLB,,, | Performed by: OPHTHALMOLOGY

## 2025-01-24 PROCEDURE — 1159F MED LIST DOCD IN RCRD: CPT | Mod: CPTII,S$GLB,, | Performed by: OPHTHALMOLOGY

## 2025-01-24 PROCEDURE — 67028 INJECTION EYE DRUG: CPT | Mod: LT,S$GLB,, | Performed by: OPHTHALMOLOGY

## 2025-01-24 RX ADMIN — Medication 1.25 MG: at 02:01

## 2025-01-24 NOTE — PROGRESS NOTES
A/P    ICD-10-CM ICD-9-CM   1. Exudative age-related macular degeneration of left eye with active choroidal neovascularization  H35.3221 362.52     362.16   2. Exudative age-related macular degeneration of right eye with inactive choroidal neovascularization  H35.3212 362.52     362.16   3. Hypertensive retinopathy, bilateral  H35.033 362.11   4. Pseudophakia of both eyes  Z96.1 V43.1   5. Vitreous degeneration of both eyes  H43.813 379.21           1. Exudative age-related macular degeneration of left eye with active choroidal neovascularization  2. 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 Mendez, transitioned care to [facility]    OD: 20/100 (stablewas 20/80 ecc central atrophy OD     Plan: Observe, inactive CNVM     OS: s/p SLY in past  S/p KENISHA X3 3/16/23   S/p SLY x4 4/30/24  VA 20/20 (stable), tr incr SRF today  Plan: given incr SRF, recommend Eylea OS today but NO GOOD DAYS funding, will give Avastin OS today     Based on todays exam, diagnostic studies, and review of records, the determination was made for treatment today.  Schedule Avastin Injection today Left Eye Patient chooses to proceed with injection R/B/A discussed include infection retinal detachment and stroke    Patient identified.  Timeout performed.    Risks, benefits, and alternatives to treatment were discussed in detail with the patient, including bleeding/infection (endophthalmitis)/etc.  The patient voiced understanding and wished to proceed with the procedure.  See separate consent form.    Injection Procedure Note:  Diagnosis: CNVM Left Eye    Topical Proparacaine drop placed then topical 5% Betadine and then subconjunctival lidocaine 2% injection  Sterile gloves used, and sterile lid speculum placed.  5% Betadine placed at injection site again prior to injection.  Avastin 1.25mg in 0.05cc Injected inferotemporally 3.5-4mm posterior to the  limbus.  Complications: None  Va at least CF at 5 feet post injection.  Retina, ONH, IOP normal after injection.    Followup as below.  Patient should return immediately PRN.  Retinal Detachment and Endophthalmitis precautions given.     Visit today is associated with current or anticipated ongoing medical care related to this patients single serious condition/complex condition (macular degeneration)     3. Hypertensive retinopathy, bilateral  PCP Aramis Barakat MD - notes reviewed  01/12/2024  5.3  A1C   Mild HTN changes  Plan: Observation  Recommend good blood pressure control, tight blood glucose control, and good cholesterol control     4. Pseudophakia of both eyes  Good lens position OU  Plan: Observation   Monocular precautions     5. Vitreous degeneration of both eyes  No RT/RD, STABLE    Plan: Observation        RTC 1-2 mo DFE/OCTm OU, poss Eylea OS     I saw and examined the patient and reviewed in detail the findings documented. The final examination findings, image interpretations, and plan as documented in the record represent my personal judgment and conclusions.    Beto Norton MD  Vitreoretinal Surgery   [facility]

## 2025-03-06 ENCOUNTER — CLINICAL SUPPORT (OUTPATIENT)
Dept: OPHTHALMOLOGY | Facility: CLINIC | Age: 80
End: 2025-03-06
Payer: MEDICARE

## 2025-03-06 ENCOUNTER — OFFICE VISIT (OUTPATIENT)
Dept: OPHTHALMOLOGY | Facility: CLINIC | Age: 80
End: 2025-03-06
Payer: MEDICARE

## 2025-03-06 DIAGNOSIS — H43.813 VITREOUS DEGENERATION OF BOTH EYES: ICD-10-CM

## 2025-03-06 DIAGNOSIS — H35.033 HYPERTENSIVE RETINOPATHY, BILATERAL: ICD-10-CM

## 2025-03-06 DIAGNOSIS — H35.3212 EXUDATIVE AGE-RELATED MACULAR DEGENERATION OF RIGHT EYE WITH INACTIVE CHOROIDAL NEOVASCULARIZATION: ICD-10-CM

## 2025-03-06 DIAGNOSIS — Z96.1 PSEUDOPHAKIA OF BOTH EYES: ICD-10-CM

## 2025-03-06 DIAGNOSIS — H35.3221 EXUDATIVE AGE-RELATED MACULAR DEGENERATION OF LEFT EYE WITH ACTIVE CHOROIDAL NEOVASCULARIZATION: Primary | ICD-10-CM

## 2025-03-06 PROCEDURE — 99999 PR PBB SHADOW E&M-EST. PATIENT-LVL III: CPT | Mod: PBBFAC,,, | Performed by: OPHTHALMOLOGY

## 2025-03-06 NOTE — PROGRESS NOTES
HPI    Patient states his vision has been stable since his last eye exam. Patient   states Denies flashes, floaters, diplopia, photophobia, glare, HA's, or   pain.     No gtts  Last edited by Guera Jara on 3/6/2025  1:31 PM.            A/P    ICD-10-CM ICD-9-CM   1. Exudative age-related macular degeneration of left eye with active choroidal neovascularization  H35.3221 362.52     362.16   2. Exudative age-related macular degeneration of right eye with inactive choroidal neovascularization  H35.3212 362.52     362.16   3. Hypertensive retinopathy, bilateral  H35.033 362.11   4. Pseudophakia of both eyes  Z96.1 V43.1   5. Vitreous degeneration of both eyes  H43.813 379.21           1. Exudative age-related macular degeneration of left eye with active choroidal neovascularization  2. 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 Mendez, transitioned care to [facility]    OD: 20/150 ecc (was 20/100) central atrophy OD     Plan: Observe, inactive CNVM monitor    OS:   S/p KENISHA X4 1/24/25  S/p SLY x4 4/30/24  VA 20/40 (was 20/20), tr SRF today - better after last Avastin  Plan: given SRF, recommend Avastin OS today , pt going out of country in [place], will recheck prior to trip    Based on todays exam, diagnostic studies, and review of records, the determination was made for treatment today.  Schedule Avastin Injection today Left Eye Patient chooses to proceed with injection R/B/A discussed include infection retinal detachment and stroke    Patient identified.  Timeout performed.    Risks, benefits, and alternatives to treatment were discussed in detail with the patient, including bleeding/infection (endophthalmitis)/etc.  The patient voiced understanding and wished to proceed with the procedure.  See separate consent form.    Injection Procedure Note:  Diagnosis: CNVM Left Eye    Topical Proparacaine drop placed then topical 5% Betadine and then  subconjunctival lidocaine 2% injection  Sterile gloves used, and sterile lid speculum placed.  5% Betadine placed at injection site again prior to injection.  Avastin 1.25mg in 0.05cc Injected inferotemporally 3.5-4mm posterior to the limbus.  Complications: None  Va at least CF at 5 feet post injection.  Retina, ONH, IOP normal after injection.    Followup as below.  Patient should return immediately PRN.  Retinal Detachment and Endophthalmitis precautions given.     Visit today is associated with current or anticipated ongoing medical care related to this patients single serious condition/complex condition (macular degeneration)     3. Hypertensive retinopathy, bilateral  PCP Aramis Barakat MD - notes reviewed  02/03/2025  5.3  A1C   Mild HTN changes  Plan: Observation  Recommend good blood pressure control, tight blood glucose control, and good cholesterol control     4. Pseudophakia of both eyes  Good lens position OU  Plan: Observation   Monocular precautions     5. Vitreous degeneration of both eyes  No RT/RD  Plan: Observation        RTC 6-7 weeks DFE/OCTm OU, poss Avastin OS     I saw and examined the patient and reviewed in detail the findings documented. The final examination findings, image interpretations, and plan as documented in the record represent my personal judgment and conclusions.    Beto Norton MD  Vitreoretinal Surgery   [facility]

## 2025-04-24 ENCOUNTER — CLINICAL SUPPORT (OUTPATIENT)
Dept: OPHTHALMOLOGY | Facility: CLINIC | Age: 80
End: 2025-04-24
Payer: MEDICARE

## 2025-04-24 ENCOUNTER — OFFICE VISIT (OUTPATIENT)
Dept: OPHTHALMOLOGY | Facility: CLINIC | Age: 80
End: 2025-04-24
Payer: MEDICARE

## 2025-04-24 DIAGNOSIS — H35.3212 EXUDATIVE AGE-RELATED MACULAR DEGENERATION OF RIGHT EYE WITH INACTIVE CHOROIDAL NEOVASCULARIZATION: ICD-10-CM

## 2025-04-24 DIAGNOSIS — H35.3221 EXUDATIVE AGE-RELATED MACULAR DEGENERATION OF LEFT EYE WITH ACTIVE CHOROIDAL NEOVASCULARIZATION: Primary | ICD-10-CM

## 2025-04-24 DIAGNOSIS — H35.033 HYPERTENSIVE RETINOPATHY, BILATERAL: ICD-10-CM

## 2025-04-24 DIAGNOSIS — H43.813 VITREOUS DEGENERATION OF BOTH EYES: ICD-10-CM

## 2025-04-24 DIAGNOSIS — Z96.1 PSEUDOPHAKIA OF BOTH EYES: ICD-10-CM

## 2025-04-24 PROCEDURE — 99999 PR PBB SHADOW E&M-EST. PATIENT-LVL III: CPT | Mod: PBBFAC,,, | Performed by: OPHTHALMOLOGY

## 2025-04-24 NOTE — PROGRESS NOTES
HPI    Pt. Presents for 6 wk f/u.    Pt. States VA has not improved since last visit. Pt. Still sees a dark   stain, in the OS. Has trouble seeing central vision  Denies F/F/Pain.  Eye meds: None.     Last edited by Gary Luevano MA on 4/24/2025  1:10 PM.            A/P    ICD-10-CM ICD-9-CM   1. Exudative age-related macular degeneration of left eye with active choroidal neovascularization  H35.3221 362.52     362.16   2. Exudative age-related macular degeneration of right eye with inactive choroidal neovascularization  H35.3212 362.52     362.16   3. Hypertensive retinopathy, bilateral  H35.033 362.11   4. Pseudophakia of both eyes  Z96.1 V43.1   5. Vitreous degeneration of both eyes  H43.813 379.21           1. Exudative age-related macular degeneration of left eye with active choroidal neovascularization  2. 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 Mendez, transitioned care to [facility]    OD: 20/80 ecc (was 20/150) central atrophy OD     Plan: Observe, inactive CNVM     OS:   S/p KENISHA X5 3/6/25  S/p SLY x4 4/30/24  VA 20/30 (was 20/20), tr SRF- better after last Avastin  Plan: given SRF, recommend Eylea OS today , pt going out of country in May,  pt wishes to have Eylea today and will pay out of pocket    Based on todays exam, diagnostic studies, and review of records, the determination was made for treatment today.  Schedule Eylea Injection today Left Eye Patient chooses to proceed with injection R/B/A discussed include infection retinal detachment and stroke    Patient identified.  Timeout performed.    Risks, benefits, and alternatives to treatment were discussed in detail with the patient, including bleeding/infection (endophthalmitis)/etc.  The patient voiced understanding and wished to proceed with the procedure.  See separate consent form.    Injection Procedure Note:  Diagnosis: CNVM Left Eye    Topical Proparacaine drop  placed then topical 5% Betadine and then subconjunctival lidocaine 2% injection  Sterile gloves used, and sterile lid speculum placed.  5% Betadine placed at injection site again prior to injection.  Eylea  2mg in 0.05cc Injected inferotemporally 3.5-4mm posterior to the limbus.  Complications: None  Va at least CF at 5 feet post injection.  Retina, ONH, IOP normal after injection.    Followup as below.  Patient should return immediately PRN.  Retinal Detachment and Endophthalmitis precautions given.     Visit today is associated with current or anticipated ongoing medical care related to this patients single serious condition/complex condition (macular degeneration)     3. Hypertensive retinopathy, bilateral  PCP Aramis Barakat MD - notes reviewed  02/03/2025  5.3  A1C   Mild HTN changes  Plan: Observation  Recommend good blood pressure control, tight blood glucose control, and good cholesterol control     4. Pseudophakia of both eyes  Good lens position OU  Plan: Observation   Monocular precautions     5. Vitreous degeneration of both eyes  No RT/RD  Plan: Observation        RTC 6 mo DFE/OCTm OU, poss Eylea OS    I saw and examined the patient and reviewed in detail the findings documented. The final examination findings, image interpretations, and plan as documented in the record represent my personal judgment and conclusions.    Beto Norton MD  Vitreoretinal Surgery   [facility]

## 2025-04-30 ENCOUNTER — OFFICE VISIT (OUTPATIENT)
Dept: DERMATOLOGY | Facility: CLINIC | Age: 80
End: 2025-04-30
Payer: MEDICARE

## 2025-04-30 VITALS — HEIGHT: 69 IN | WEIGHT: 165 LBS | BODY MASS INDEX: 24.44 KG/M2

## 2025-04-30 DIAGNOSIS — Z85.828 ENCOUNTER FOR FOLLOW-UP SURVEILLANCE OF SKIN CANCER: Primary | ICD-10-CM

## 2025-04-30 DIAGNOSIS — L73.8 SEBACEOUS HYPERPLASIA OF FACE: ICD-10-CM

## 2025-04-30 DIAGNOSIS — D22.9 MULTIPLE BENIGN NEVI: ICD-10-CM

## 2025-04-30 DIAGNOSIS — Z08 ENCOUNTER FOR FOLLOW-UP SURVEILLANCE OF SKIN CANCER: Primary | ICD-10-CM

## 2025-04-30 DIAGNOSIS — D18.01 CHERRY ANGIOMA: ICD-10-CM

## 2025-04-30 DIAGNOSIS — L82.1 SEBORRHEIC KERATOSES: ICD-10-CM

## 2025-04-30 DIAGNOSIS — L57.8 ACTINIC SKIN DAMAGE: ICD-10-CM

## 2025-04-30 NOTE — PROGRESS NOTES
Subjective:      Patient ID:  Jean Claude Menard is a 80 y.o. male who presents for   Chief Complaint   Patient presents with    Skin Check     [facility]     LOV- 1/6/24- Dr SHAIKH- [facility] of left side of nose    Here today for an UBSC    Derm Hx   [facility] - left side of nose- Dr SHAIKH 11/24  [facility], left neck, Dr. K MOHS  Denies Fhx MM    Current Outpatient Medications:   ·  aspirin (ECOTRIN) 81 MG EC tablet, Take 1 tablet (81 mg total) by mouth once daily. Take with food, Disp: 180 tablet, Rfl: 0  ·  cholecalciferol, vitamin D3, (VITAMIN D3) 50 mcg (2,000 unit) Tab, Take 1 tablet (2,000 Units total) by mouth once daily., Disp: , Rfl:   ·  coenzyme Q10 (CO Q-10) 100 mg capsule, Take 100 mg by mouth once daily., Disp: , Rfl:   ·  febuxostat (ULORIC) 40 mg Tab, Take 1 tablet (40 mg total) by mouth once daily., Disp: 90 tablet, Rfl: 3  ·  glucosam/chond/collagen/hyalur (JOINT SUPPORT ORAL), Take by mouth., Disp: , Rfl:   ·  magnesium 30 mg Tab, Take by mouth once., Disp: , Rfl:   ·  multivitamin (THERAGRAN) per tablet, Take 1 tablet by mouth once daily., Disp: , Rfl:   ·  rosuvastatin (CRESTOR) 10 MG tablet, TAKE 1 TABLET(10 MG) BY MOUTH EVERY EVENING, Disp: 90 tablet, Rfl: 3  ·  UNABLE TO FIND, Prostate Support, Disp: , Rfl:   ·  vit C/E/Zn/coppr/lutein/zeaxan (PRESERVISION AREDS-2 ORAL), , Disp: , Rfl:                Review of Systems   Constitutional:  Negative for fever, chills and fatigue.   Respiratory:  Negative for cough and shortness of breath.    Gastrointestinal:  Negative for nausea, vomiting and diarrhea.   Skin:  Positive for activity-related sunscreen use and wears hat. Negative for daily sunscreen use.   Hematologic/Lymphatic: Does not bruise/bleed easily.       Objective:   Physical Exam   Constitutional: He appears well-developed and well-nourished. No distress.   Neurological: He is alert and oriented to person, place, and time. He is not disoriented.   Psychiatric: He has a normal mood and affect.   Skin:   Areas  Examined (abnormalities noted in diagram):   Scalp / Hair Palpated and Inspected  Head / Face Inspection Performed  Neck Inspection Performed  Chest / Axilla Inspection Performed  Abdomen Inspection Performed  Back Inspection Performed  RUE Inspected  LUE Inspection Performed  Nails and Digits Inspection Performed                     Diagram Legend     Erythematous scaling macule/papule c/w actinic keratosis       Vascular papule c/w angioma      Pigmented verrucoid papule/plaque c/w seborrheic keratosis      Yellow umbilicated papule c/w sebaceous hyperplasia      Irregularly shaped tan macule c/w lentigo     1-2 mm smooth white papules consistent with Milia      Movable subcutaneous cyst with punctum c/w epidermal inclusion cyst      Subcutaneous movable cyst c/w pilar cyst      Firm pink to brown papule c/w dermatofibroma      Pedunculated fleshy papule(s) c/w skin tag(s)      Evenly pigmented macule c/w junctional nevus     Mildly variegated pigmented, slightly irregular-bordered macule c/w mildly atypical nevus      Flesh colored to evenly pigmented papule c/w intradermal nevus       Pink pearly papule/plaque c/w basal cell carcinoma      Erythematous hyperkeratotic cursted plaque c/w SCC      Surgical scar with no sign of skin cancer recurrence      Open and closed comedones      Inflammatory papules and pustules      Verrucoid papule consistent consistent with wart     Erythematous eczematous patches and plaques     Dystrophic onycholytic nail with subungual debris c/w onychomycosis     Umbilicated papule    Erythematous-base heme-crusted tan verrucoid plaque consistent with inflamed seborrheic keratosis     Erythematous Silvery Scaling Plaque c/w Psoriasis     See annotation      Assessment / Plan:        Encounter for follow-up surveillance of skin cancer  Area of previous NMSC (x2) examined. Sites well healed with no signs of recurrence.  Upper body skin examination performed today including at least 9  points as noted in physical examination. No lesions suspicious for malignancy noted.    Seborrheic keratoses  These are benign inherited growths without a malignant potential. Reassurance given to patient. No treatment is necessary.     Sebaceous hyperplasia of face  This is a common condition representing benign enlargement of the sebaceous lobule. It typically occurs in adulthood. Reassurance given to patient.     Kendall angioma  This is a benign vascular lesion. Reassurance given. No treatment required.     Multiple benign nevi  Careful dermoscopy evaluation of nevi performed with none identified as needing biopsy today  Monitor for new mole or moles that are becoming bigger, darker, irritated, or developing irregular borders.     Actinic skin damage  Patient instructed in importance in daily broad spectrum sun protection of at least spf 30. Mineral sunscreen ingredients preferred (Zinc +/- Titanium) and can be found OTC.   Patient encouraged to wear hat for all outdoor exposure.   Also discussed sun avoidance and use of protective clothing.           No follow-ups on file.